# Patient Record
Sex: FEMALE | Race: WHITE | NOT HISPANIC OR LATINO | Employment: UNEMPLOYED | URBAN - METROPOLITAN AREA
[De-identification: names, ages, dates, MRNs, and addresses within clinical notes are randomized per-mention and may not be internally consistent; named-entity substitution may affect disease eponyms.]

---

## 2019-01-05 ENCOUNTER — APPOINTMENT (OUTPATIENT)
Dept: RADIOLOGY | Facility: MEDICAL CENTER | Age: 24
DRG: 087 | End: 2019-01-05
Attending: NURSE PRACTITIONER
Payer: COMMERCIAL

## 2019-01-05 ENCOUNTER — APPOINTMENT (OUTPATIENT)
Dept: RADIOLOGY | Facility: MEDICAL CENTER | Age: 24
DRG: 087 | End: 2019-01-05
Attending: EMERGENCY MEDICINE
Payer: COMMERCIAL

## 2019-01-05 ENCOUNTER — HOSPITAL ENCOUNTER (INPATIENT)
Facility: MEDICAL CENTER | Age: 24
LOS: 4 days | DRG: 087 | End: 2019-01-09
Attending: EMERGENCY MEDICINE | Admitting: SURGERY
Payer: COMMERCIAL

## 2019-01-05 PROBLEM — E87.6 HYPOKALEMIA: Status: ACTIVE | Noted: 2019-01-05

## 2019-01-05 PROBLEM — S01.01XA LACERATION OF SCALP WITHOUT FOREIGN BODY: Status: ACTIVE | Noted: 2019-01-05

## 2019-01-05 PROBLEM — S02.5XXD CLOSED FRACTURE OF TOOTH WITH ROUTINE HEALING: Status: ACTIVE | Noted: 2019-01-05

## 2019-01-05 PROBLEM — S42.031D CLOSED DISPLACED FRACTURE OF ACROMIAL END OF RIGHT CLAVICLE WITH ROUTINE HEALING: Status: ACTIVE | Noted: 2019-01-05

## 2019-01-05 PROBLEM — S06.0X1A CONCUSSION WITH BRIEF LOC: Status: ACTIVE | Noted: 2019-01-05

## 2019-01-05 PROBLEM — Z53.09 CONTRAINDICATION TO ANTICOAGULATION THERAPY: Status: ACTIVE | Noted: 2019-01-05

## 2019-01-05 PROBLEM — T14.90XA TRAUMA: Status: ACTIVE | Noted: 2019-01-05

## 2019-01-05 PROBLEM — S06.341A TRAUMATIC HEMORRHAGE OF RIGHT CEREBRUM WITH LOSS OF CONSCIOUSNESS OF 30 MINUTES OR LESS (HCC): Status: ACTIVE | Noted: 2019-01-05

## 2019-01-05 PROBLEM — M79.641 PAIN OF RIGHT HAND: Status: ACTIVE | Noted: 2019-01-05

## 2019-01-05 LAB
ABO GROUP BLD: NORMAL
ABO GROUP BLD: NORMAL
ALBUMIN SERPL BCP-MCNC: 4.5 G/DL (ref 3.2–4.9)
ALBUMIN/GLOB SERPL: 1.7 G/DL
ALP SERPL-CCNC: 54 U/L (ref 30–99)
ALT SERPL-CCNC: 18 U/L (ref 2–50)
ANION GAP SERPL CALC-SCNC: 14 MMOL/L (ref 0–11.9)
APTT PPP: 29.5 SEC (ref 24.7–36)
AST SERPL-CCNC: 24 U/L (ref 12–45)
BILIRUB SERPL-MCNC: 1.1 MG/DL (ref 0.1–1.5)
BLD GP AB SCN SERPL QL: NORMAL
BUN SERPL-MCNC: 13 MG/DL (ref 8–22)
CALCIUM SERPL-MCNC: 9.2 MG/DL (ref 8.5–10.5)
CFT BLD TEG: 5.1 MIN (ref 5–10)
CHLORIDE SERPL-SCNC: 106 MMOL/L (ref 96–112)
CLOT ANGLE BLD TEG: 64.3 DEGREES (ref 53–72)
CLOT LYSIS 30M P MA LENFR BLD TEG: 0 % (ref 0–8)
CO2 SERPL-SCNC: 18 MMOL/L (ref 20–33)
CREAT SERPL-MCNC: 0.75 MG/DL (ref 0.5–1.4)
CT.EXTRINSIC BLD ROTEM: 1.9 MIN (ref 1–3)
ERYTHROCYTE [DISTWIDTH] IN BLOOD BY AUTOMATED COUNT: 40 FL (ref 35.9–50)
ETHANOL BLD-MCNC: 0 G/DL
GLOBULIN SER CALC-MCNC: 2.7 G/DL (ref 1.9–3.5)
GLUCOSE SERPL-MCNC: 166 MG/DL (ref 65–99)
HCG SERPL QL: NEGATIVE
HCT VFR BLD AUTO: 39 % (ref 37–47)
HGB BLD-MCNC: 13.1 G/DL (ref 12–16)
INR PPP: 1.2 (ref 0.87–1.13)
LACTATE BLD-SCNC: 3.1 MMOL/L (ref 0.5–2)
MCF BLD TEG: 62.7 MM (ref 50–70)
MCH RBC QN AUTO: 30.5 PG (ref 27–33)
MCHC RBC AUTO-ENTMCNC: 33.6 G/DL (ref 33.6–35)
MCV RBC AUTO: 90.9 FL (ref 81.4–97.8)
PA AA BLD-ACNC: 0 %
PA ADP BLD-ACNC: 31 %
PLATELET # BLD AUTO: 347 K/UL (ref 164–446)
PMV BLD AUTO: 9.5 FL (ref 9–12.9)
POTASSIUM SERPL-SCNC: 2.6 MMOL/L (ref 3.6–5.5)
PROT SERPL-MCNC: 7.2 G/DL (ref 6–8.2)
PROTHROMBIN TIME: 15.3 SEC (ref 12–14.6)
RBC # BLD AUTO: 4.29 M/UL (ref 4.2–5.4)
RH BLD: NORMAL
RH BLD: NORMAL
SODIUM SERPL-SCNC: 138 MMOL/L (ref 135–145)
TEG ALGORITHM TGALG: NORMAL
WBC # BLD AUTO: 9.1 K/UL (ref 4.8–10.8)

## 2019-01-05 PROCEDURE — 90471 IMMUNIZATION ADMIN: CPT

## 2019-01-05 PROCEDURE — 96374 THER/PROPH/DIAG INJ IV PUSH: CPT

## 2019-01-05 PROCEDURE — 85384 FIBRINOGEN ACTIVITY: CPT

## 2019-01-05 PROCEDURE — 70450 CT HEAD/BRAIN W/O DYE: CPT

## 2019-01-05 PROCEDURE — 700101 HCHG RX REV CODE 250: Performed by: NURSE PRACTITIONER

## 2019-01-05 PROCEDURE — 86850 RBC ANTIBODY SCREEN: CPT

## 2019-01-05 PROCEDURE — 85576 BLOOD PLATELET AGGREGATION: CPT | Mod: 91

## 2019-01-05 PROCEDURE — 83605 ASSAY OF LACTIC ACID: CPT

## 2019-01-05 PROCEDURE — 0HQ0XZZ REPAIR SCALP SKIN, EXTERNAL APPROACH: ICD-10-PCS | Performed by: SURGERY

## 2019-01-05 PROCEDURE — 70486 CT MAXILLOFACIAL W/O DYE: CPT

## 2019-01-05 PROCEDURE — 99291 CRITICAL CARE FIRST HOUR: CPT

## 2019-01-05 PROCEDURE — 85027 COMPLETE CBC AUTOMATED: CPT

## 2019-01-05 PROCEDURE — 85610 PROTHROMBIN TIME: CPT

## 2019-01-05 PROCEDURE — 700101 HCHG RX REV CODE 250: Performed by: SURGERY

## 2019-01-05 PROCEDURE — 90715 TDAP VACCINE 7 YRS/> IM: CPT | Performed by: EMERGENCY MEDICINE

## 2019-01-05 PROCEDURE — 80307 DRUG TEST PRSMV CHEM ANLYZR: CPT

## 2019-01-05 PROCEDURE — G0390 TRAUMA RESPONS W/HOSP CRITI: HCPCS

## 2019-01-05 PROCEDURE — A9270 NON-COVERED ITEM OR SERVICE: HCPCS | Performed by: SURGERY

## 2019-01-05 PROCEDURE — 72170 X-RAY EXAM OF PELVIS: CPT

## 2019-01-05 PROCEDURE — 86901 BLOOD TYPING SEROLOGIC RH(D): CPT

## 2019-01-05 PROCEDURE — 770022 HCHG ROOM/CARE - ICU (200)

## 2019-01-05 PROCEDURE — 71045 X-RAY EXAM CHEST 1 VIEW: CPT

## 2019-01-05 PROCEDURE — 86900 BLOOD TYPING SEROLOGIC ABO: CPT

## 2019-01-05 PROCEDURE — 700117 HCHG RX CONTRAST REV CODE 255: Performed by: EMERGENCY MEDICINE

## 2019-01-05 PROCEDURE — 72128 CT CHEST SPINE W/O DYE: CPT

## 2019-01-05 PROCEDURE — 700111 HCHG RX REV CODE 636 W/ 250 OVERRIDE (IP): Performed by: NURSE PRACTITIONER

## 2019-01-05 PROCEDURE — 72125 CT NECK SPINE W/O DYE: CPT

## 2019-01-05 PROCEDURE — 700111 HCHG RX REV CODE 636 W/ 250 OVERRIDE (IP): Performed by: EMERGENCY MEDICINE

## 2019-01-05 PROCEDURE — 700101 HCHG RX REV CODE 250

## 2019-01-05 PROCEDURE — 94760 N-INVAS EAR/PLS OXIMETRY 1: CPT

## 2019-01-05 PROCEDURE — 76705 ECHO EXAM OF ABDOMEN: CPT

## 2019-01-05 PROCEDURE — 71260 CT THORAX DX C+: CPT

## 2019-01-05 PROCEDURE — 84703 CHORIONIC GONADOTROPIN ASSAY: CPT

## 2019-01-05 PROCEDURE — 700102 HCHG RX REV CODE 250 W/ 637 OVERRIDE(OP): Performed by: SURGERY

## 2019-01-05 PROCEDURE — 85730 THROMBOPLASTIN TIME PARTIAL: CPT

## 2019-01-05 PROCEDURE — 700105 HCHG RX REV CODE 258: Performed by: NURSE PRACTITIONER

## 2019-01-05 PROCEDURE — 73120 X-RAY EXAM OF HAND: CPT | Mod: RT

## 2019-01-05 PROCEDURE — A9270 NON-COVERED ITEM OR SERVICE: HCPCS | Performed by: NURSE PRACTITIONER

## 2019-01-05 PROCEDURE — 80053 COMPREHEN METABOLIC PANEL: CPT

## 2019-01-05 PROCEDURE — 85347 COAGULATION TIME ACTIVATED: CPT

## 2019-01-05 PROCEDURE — 72131 CT LUMBAR SPINE W/O DYE: CPT

## 2019-01-05 PROCEDURE — 700102 HCHG RX REV CODE 250 W/ 637 OVERRIDE(OP): Performed by: NURSE PRACTITIONER

## 2019-01-05 RX ORDER — POTASSIUM CHLORIDE 20 MEQ/1
40 TABLET, EXTENDED RELEASE ORAL ONCE
Status: COMPLETED | OUTPATIENT
Start: 2019-01-05 | End: 2019-01-05

## 2019-01-05 RX ORDER — BACITRACIN ZINC AND POLYMYXIN B SULFATE 500; 1000 [USP'U]/G; [USP'U]/G
OINTMENT TOPICAL 3 TIMES DAILY
Status: DISCONTINUED | OUTPATIENT
Start: 2019-01-05 | End: 2019-01-09 | Stop reason: HOSPADM

## 2019-01-05 RX ORDER — SODIUM CHLORIDE 9 MG/ML
INJECTION, SOLUTION INTRAVENOUS CONTINUOUS
Status: DISCONTINUED | OUTPATIENT
Start: 2019-01-05 | End: 2019-01-05

## 2019-01-05 RX ORDER — DOCUSATE SODIUM 100 MG/1
100 CAPSULE, LIQUID FILLED ORAL 2 TIMES DAILY
Status: DISCONTINUED | OUTPATIENT
Start: 2019-01-05 | End: 2019-01-09 | Stop reason: HOSPADM

## 2019-01-05 RX ORDER — LEVETIRACETAM 500 MG/1
500 TABLET ORAL 2 TIMES DAILY
Status: DISCONTINUED | OUTPATIENT
Start: 2019-01-05 | End: 2019-01-09 | Stop reason: HOSPADM

## 2019-01-05 RX ORDER — LEVETIRACETAM 500 MG/1
500 TABLET ORAL 2 TIMES DAILY
Status: DISCONTINUED | OUTPATIENT
Start: 2019-01-05 | End: 2019-01-05

## 2019-01-05 RX ORDER — ONDANSETRON 2 MG/ML
4 INJECTION INTRAMUSCULAR; INTRAVENOUS EVERY 4 HOURS PRN
Status: DISCONTINUED | OUTPATIENT
Start: 2019-01-05 | End: 2019-01-09 | Stop reason: HOSPADM

## 2019-01-05 RX ORDER — ENEMA 19; 7 G/133ML; G/133ML
1 ENEMA RECTAL
Status: DISCONTINUED | OUTPATIENT
Start: 2019-01-05 | End: 2019-01-09 | Stop reason: HOSPADM

## 2019-01-05 RX ORDER — SODIUM CHLORIDE AND POTASSIUM CHLORIDE 150; 900 MG/100ML; MG/100ML
INJECTION, SOLUTION INTRAVENOUS CONTINUOUS
Status: DISCONTINUED | OUTPATIENT
Start: 2019-01-05 | End: 2019-01-07

## 2019-01-05 RX ORDER — BISACODYL 10 MG
10 SUPPOSITORY, RECTAL RECTAL
Status: DISCONTINUED | OUTPATIENT
Start: 2019-01-05 | End: 2019-01-09 | Stop reason: HOSPADM

## 2019-01-05 RX ORDER — AMOXICILLIN 250 MG
1 CAPSULE ORAL NIGHTLY
Status: DISCONTINUED | OUTPATIENT
Start: 2019-01-05 | End: 2019-01-09 | Stop reason: HOSPADM

## 2019-01-05 RX ORDER — LIDOCAINE HYDROCHLORIDE AND EPINEPHRINE 10; 10 MG/ML; UG/ML
INJECTION, SOLUTION INFILTRATION; PERINEURAL
Status: COMPLETED
Start: 2019-01-05 | End: 2019-01-05

## 2019-01-05 RX ORDER — OXYCODONE HYDROCHLORIDE 10 MG/1
10 TABLET ORAL
Status: DISCONTINUED | OUTPATIENT
Start: 2019-01-05 | End: 2019-01-09 | Stop reason: HOSPADM

## 2019-01-05 RX ORDER — POLYETHYLENE GLYCOL 3350 17 G/17G
1 POWDER, FOR SOLUTION ORAL 2 TIMES DAILY
Status: DISCONTINUED | OUTPATIENT
Start: 2019-01-05 | End: 2019-01-09 | Stop reason: HOSPADM

## 2019-01-05 RX ORDER — FAMOTIDINE 20 MG/1
20 TABLET, FILM COATED ORAL 2 TIMES DAILY
Status: DISCONTINUED | OUTPATIENT
Start: 2019-01-05 | End: 2019-01-07

## 2019-01-05 RX ORDER — ACETAMINOPHEN 500 MG
1000 TABLET ORAL EVERY 6 HOURS
Status: DISCONTINUED | OUTPATIENT
Start: 2019-01-05 | End: 2019-01-07

## 2019-01-05 RX ORDER — AMOXICILLIN 250 MG
1 CAPSULE ORAL
Status: DISCONTINUED | OUTPATIENT
Start: 2019-01-05 | End: 2019-01-09 | Stop reason: HOSPADM

## 2019-01-05 RX ORDER — LIDOCAINE HYDROCHLORIDE AND EPINEPHRINE 10; 10 MG/ML; UG/ML
10 INJECTION, SOLUTION INFILTRATION; PERINEURAL ONCE
Status: COMPLETED | OUTPATIENT
Start: 2019-01-05 | End: 2019-01-05

## 2019-01-05 RX ORDER — OXYCODONE HYDROCHLORIDE 5 MG/1
5 TABLET ORAL
Status: DISCONTINUED | OUTPATIENT
Start: 2019-01-05 | End: 2019-01-09 | Stop reason: HOSPADM

## 2019-01-05 RX ADMIN — SODIUM CHLORIDE: 9 INJECTION, SOLUTION INTRAVENOUS at 13:17

## 2019-01-05 RX ADMIN — ACETAMINOPHEN 1000 MG: 500 TABLET, FILM COATED ORAL at 23:52

## 2019-01-05 RX ADMIN — CLOSTRIDIUM TETANI TOXOID ANTIGEN (FORMALDEHYDE INACTIVATED), CORYNEBACTERIUM DIPHTHERIAE TOXOID ANTIGEN (FORMALDEHYDE INACTIVATED), BORDETELLA PERTUSSIS TOXOID ANTIGEN (GLUTARALDEHYDE INACTIVATED), BORDETELLA PERTUSSIS FILAMENTOUS HEMAGGLUTININ ANTIGEN (FORMALDEHYDE INACTIVATED), BORDETELLA PERTUSSIS PERTACTIN ANTIGEN, AND BORDETELLA PERTUSSIS FIMBRIAE 2/3 ANTIGEN 0.5 ML: 5; 2; 2.5; 5; 3; 5 INJECTION, SUSPENSION INTRAMUSCULAR at 12:39

## 2019-01-05 RX ADMIN — LEVETIRACETAM 500 MG: 500 TABLET, FILM COATED ORAL at 14:55

## 2019-01-05 RX ADMIN — ONDANSETRON 4 MG: 2 INJECTION INTRAMUSCULAR; INTRAVENOUS at 19:39

## 2019-01-05 RX ADMIN — LIDOCAINE HYDROCHLORIDE AND EPINEPHRINE 10 ML: 10; 10 INJECTION, SOLUTION INFILTRATION; PERINEURAL at 15:45

## 2019-01-05 RX ADMIN — FAMOTIDINE 20 MG: 20 TABLET ORAL at 17:48

## 2019-01-05 RX ADMIN — IOHEXOL 100 ML: 350 INJECTION, SOLUTION INTRAVENOUS at 12:35

## 2019-01-05 RX ADMIN — POTASSIUM CHLORIDE AND SODIUM CHLORIDE: 900; 150 INJECTION, SOLUTION INTRAVENOUS at 15:59

## 2019-01-05 RX ADMIN — OXYCODONE HYDROCHLORIDE 5 MG: 5 TABLET ORAL at 19:39

## 2019-01-05 RX ADMIN — ONDANSETRON 4 MG: 2 INJECTION INTRAMUSCULAR; INTRAVENOUS at 13:17

## 2019-01-05 RX ADMIN — FENTANYL CITRATE 100 MCG: 50 INJECTION, SOLUTION INTRAMUSCULAR; INTRAVENOUS at 11:48

## 2019-01-05 RX ADMIN — Medication 1 EACH: at 17:48

## 2019-01-05 RX ADMIN — Medication 1 EACH: at 13:17

## 2019-01-05 RX ADMIN — ACETAMINOPHEN 1000 MG: 500 TABLET, FILM COATED ORAL at 17:48

## 2019-01-05 RX ADMIN — POTASSIUM CHLORIDE 40 MEQ: 1500 TABLET, EXTENDED RELEASE ORAL at 15:58

## 2019-01-05 RX ADMIN — LIDOCAINE HYDROCHLORIDE,EPINEPHRINE BITARTRATE 10 ML: 10; .01 INJECTION, SOLUTION INFILTRATION; PERINEURAL at 15:45

## 2019-01-05 ASSESSMENT — PAIN SCALES - GENERAL
PAINLEVEL_OUTOF10: 7
PAINLEVEL_OUTOF10: 7
PAINLEVEL_OUTOF10: 6
PAINLEVEL_OUTOF10: 5
PAINLEVEL_OUTOF10: 3
PAINLEVEL_OUTOF10: 7

## 2019-01-05 ASSESSMENT — LIFESTYLE VARIABLES
HAVE PEOPLE ANNOYED YOU BY CRITICIZING YOUR DRINKING: NO
EVER HAD A DRINK FIRST THING IN THE MORNING TO STEADY YOUR NERVES TO GET RID OF A HANGOVER: NO
HAVE YOU EVER FELT YOU SHOULD CUT DOWN ON YOUR DRINKING: NO
TOTAL SCORE: 0
CONSUMPTION TOTAL: NEGATIVE
TOTAL SCORE: 0
HOW MANY TIMES IN THE PAST YEAR HAVE YOU HAD 5 OR MORE DRINKS IN A DAY: 0
ALCOHOL_USE: YES
EVER_SMOKED: NEVER
EVER FELT BAD OR GUILTY ABOUT YOUR DRINKING: NO
ON A TYPICAL DAY WHEN YOU DRINK ALCOHOL HOW MANY DRINKS DO YOU HAVE: 0
TOTAL SCORE: 0
AVERAGE NUMBER OF DAYS PER WEEK YOU HAVE A DRINK CONTAINING ALCOHOL: 0

## 2019-01-05 ASSESSMENT — COGNITIVE AND FUNCTIONAL STATUS - GENERAL
SUGGESTED CMS G CODE MODIFIER MOBILITY: CH
SUGGESTED CMS G CODE MODIFIER DAILY ACTIVITY: CH
DAILY ACTIVITIY SCORE: 24
MOBILITY SCORE: 24

## 2019-01-05 ASSESSMENT — PATIENT HEALTH QUESTIONNAIRE - PHQ9
2. FEELING DOWN, DEPRESSED, IRRITABLE, OR HOPELESS: NOT AT ALL
1. LITTLE INTEREST OR PLEASURE IN DOING THINGS: NOT AT ALL
SUM OF ALL RESPONSES TO PHQ9 QUESTIONS 1 AND 2: 0

## 2019-01-05 ASSESSMENT — COPD QUESTIONNAIRES
COPD SCREENING SCORE: 0
DURING THE PAST 4 WEEKS HOW MUCH DID YOU FEEL SHORT OF BREATH: NONE/LITTLE OF THE TIME
DO YOU EVER COUGH UP ANY MUCUS OR PHLEGM?: NO/ONLY WITH OCCASIONAL COLDS OR INFECTIONS
HAVE YOU SMOKED AT LEAST 100 CIGARETTES IN YOUR ENTIRE LIFE: NO/DON'T KNOW

## 2019-01-05 NOTE — ASSESSMENT & PLAN NOTE
Right distal clavicle fracture.  1/8 Upright films show fracture of the distal 1 cm of the clavicle with full bone width displacement of the proximal clavicle. There is also widening of the coracoclavicular distance.  Non-operative management.   Sling for comfort  Weight bearing status - Nonweightbearing RUE. Ok for range of motion  Follow up in one week for referral to Sports[facility]icine  Rubén Pfeiffer MD. Orthopedic Surgery.
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
## 2019-01-05 NOTE — PROGRESS NOTES
David from Lab called with critical result of K of 2.6 at 1507. Critical lab result read back to David.    notified of critical lab result at 1507.  Critical lab result read back by .

## 2019-01-05 NOTE — PROGRESS NOTES
1245 This RN at bedside to blue 15 to  patient. Patient connected to ICU monitor. Xray at bedside, transfer to ICU pended due to Xray. Pt A/Ox4, drowsy.     1305 Arrival in ICU, pt transferred to ICU bed, maintaining full spinal precautions. Pt nauseous and vomiting, yankaur suction in use. Pt protecting airway.     1330 Dr. Willis at bedside to assess patient.
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
## 2019-01-05 NOTE — RESPIRATORY CARE
Respiratory Trauma Red Note    Intubation No    Trauma upgraded to red due to hypotension. No respiratory interventions required at this time. See trauma narrator for full details
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
## 2019-01-06 ENCOUNTER — APPOINTMENT (OUTPATIENT)
Dept: RADIOLOGY | Facility: MEDICAL CENTER | Age: 24
DRG: 087 | End: 2019-01-06
Attending: NURSE PRACTITIONER
Payer: COMMERCIAL

## 2019-01-06 PROBLEM — S06.0X1A CONCUSSION WITH BRIEF LOC: Status: RESOLVED | Noted: 2019-01-05 | Resolved: 2019-01-06

## 2019-01-06 LAB
ALBUMIN SERPL BCP-MCNC: 3.4 G/DL (ref 3.2–4.9)
ALBUMIN/GLOB SERPL: 1.4 G/DL
ALP SERPL-CCNC: 41 U/L (ref 30–99)
ALT SERPL-CCNC: 16 U/L (ref 2–50)
ANION GAP SERPL CALC-SCNC: 7 MMOL/L (ref 0–11.9)
AST SERPL-CCNC: 18 U/L (ref 12–45)
BASOPHILS # BLD AUTO: 0.4 % (ref 0–1.8)
BASOPHILS # BLD: 0.03 K/UL (ref 0–0.12)
BILIRUB SERPL-MCNC: 1.2 MG/DL (ref 0.1–1.5)
BUN SERPL-MCNC: 9 MG/DL (ref 8–22)
CALCIUM SERPL-MCNC: 8.8 MG/DL (ref 8.5–10.5)
CHLORIDE SERPL-SCNC: 112 MMOL/L (ref 96–112)
CO2 SERPL-SCNC: 20 MMOL/L (ref 20–33)
CREAT SERPL-MCNC: 0.66 MG/DL (ref 0.5–1.4)
EOSINOPHIL # BLD AUTO: 0.01 K/UL (ref 0–0.51)
EOSINOPHIL NFR BLD: 0.1 % (ref 0–6.9)
ERYTHROCYTE [DISTWIDTH] IN BLOOD BY AUTOMATED COUNT: 42.1 FL (ref 35.9–50)
GLOBULIN SER CALC-MCNC: 2.4 G/DL (ref 1.9–3.5)
GLUCOSE SERPL-MCNC: 84 MG/DL (ref 65–99)
HCT VFR BLD AUTO: 32.2 % (ref 37–47)
HGB BLD-MCNC: 10.6 G/DL (ref 12–16)
IMM GRANULOCYTES # BLD AUTO: 0.02 K/UL (ref 0–0.11)
IMM GRANULOCYTES NFR BLD AUTO: 0.3 % (ref 0–0.9)
LYMPHOCYTES # BLD AUTO: 1.75 K/UL (ref 1–4.8)
LYMPHOCYTES NFR BLD: 22.5 % (ref 22–41)
MCH RBC QN AUTO: 30.6 PG (ref 27–33)
MCHC RBC AUTO-ENTMCNC: 32.9 G/DL (ref 33.6–35)
MCV RBC AUTO: 93.1 FL (ref 81.4–97.8)
MONOCYTES # BLD AUTO: 0.76 K/UL (ref 0–0.85)
MONOCYTES NFR BLD AUTO: 9.8 % (ref 0–13.4)
NEUTROPHILS # BLD AUTO: 5.2 K/UL (ref 2–7.15)
NEUTROPHILS NFR BLD: 66.9 % (ref 44–72)
NRBC # BLD AUTO: 0 K/UL
NRBC BLD-RTO: 0 /100 WBC
PLATELET # BLD AUTO: 202 K/UL (ref 164–446)
PMV BLD AUTO: 9.6 FL (ref 9–12.9)
POTASSIUM SERPL-SCNC: 4.1 MMOL/L (ref 3.6–5.5)
PROT SERPL-MCNC: 5.8 G/DL (ref 6–8.2)
RBC # BLD AUTO: 3.46 M/UL (ref 4.2–5.4)
SODIUM SERPL-SCNC: 139 MMOL/L (ref 135–145)
WBC # BLD AUTO: 7.8 K/UL (ref 4.8–10.8)

## 2019-01-06 PROCEDURE — A9270 NON-COVERED ITEM OR SERVICE: HCPCS | Performed by: NURSE PRACTITIONER

## 2019-01-06 PROCEDURE — 85025 COMPLETE CBC W/AUTO DIFF WBC: CPT

## 2019-01-06 PROCEDURE — 93970 EXTREMITY STUDY: CPT | Mod: 26 | Performed by: SURGERY

## 2019-01-06 PROCEDURE — 700101 HCHG RX REV CODE 250: Performed by: SURGERY

## 2019-01-06 PROCEDURE — 770022 HCHG ROOM/CARE - ICU (200)

## 2019-01-06 PROCEDURE — 700102 HCHG RX REV CODE 250 W/ 637 OVERRIDE(OP): Performed by: NURSE PRACTITIONER

## 2019-01-06 PROCEDURE — 700111 HCHG RX REV CODE 636 W/ 250 OVERRIDE (IP): Performed by: NURSE PRACTITIONER

## 2019-01-06 PROCEDURE — 71045 X-RAY EXAM CHEST 1 VIEW: CPT

## 2019-01-06 PROCEDURE — 80053 COMPREHEN METABOLIC PANEL: CPT

## 2019-01-06 PROCEDURE — 99233 SBSQ HOSP IP/OBS HIGH 50: CPT | Performed by: SURGERY

## 2019-01-06 PROCEDURE — 700112 HCHG RX REV CODE 229: Performed by: NURSE PRACTITIONER

## 2019-01-06 PROCEDURE — 700101 HCHG RX REV CODE 250: Performed by: NURSE PRACTITIONER

## 2019-01-06 PROCEDURE — 700111 HCHG RX REV CODE 636 W/ 250 OVERRIDE (IP): Performed by: SURGERY

## 2019-01-06 PROCEDURE — 93970 EXTREMITY STUDY: CPT

## 2019-01-06 RX ADMIN — LEVETIRACETAM 500 MG: 500 TABLET, FILM COATED ORAL at 05:21

## 2019-01-06 RX ADMIN — ACETAMINOPHEN 1000 MG: 500 TABLET, FILM COATED ORAL at 17:49

## 2019-01-06 RX ADMIN — ACETAMINOPHEN 1000 MG: 500 TABLET, FILM COATED ORAL at 05:21

## 2019-01-06 RX ADMIN — Medication 1 EACH: at 17:46

## 2019-01-06 RX ADMIN — LEVETIRACETAM 500 MG: 500 TABLET, FILM COATED ORAL at 17:45

## 2019-01-06 RX ADMIN — ONDANSETRON 4 MG: 2 INJECTION INTRAMUSCULAR; INTRAVENOUS at 13:53

## 2019-01-06 RX ADMIN — ONDANSETRON 4 MG: 2 INJECTION INTRAMUSCULAR; INTRAVENOUS at 17:46

## 2019-01-06 RX ADMIN — ACETAMINOPHEN 1000 MG: 500 TABLET, FILM COATED ORAL at 11:29

## 2019-01-06 RX ADMIN — OXYCODONE HYDROCHLORIDE 5 MG: 5 TABLET ORAL at 14:34

## 2019-01-06 RX ADMIN — DOCUSATE SODIUM 100 MG: 100 CAPSULE, LIQUID FILLED ORAL at 05:21

## 2019-01-06 RX ADMIN — FAMOTIDINE 20 MG: 20 TABLET ORAL at 17:45

## 2019-01-06 RX ADMIN — Medication 1 EACH: at 05:21

## 2019-01-06 RX ADMIN — Medication 1 EACH: at 11:29

## 2019-01-06 RX ADMIN — ENOXAPARIN SODIUM 30 MG: 100 INJECTION SUBCUTANEOUS at 11:29

## 2019-01-06 RX ADMIN — POTASSIUM CHLORIDE AND SODIUM CHLORIDE: 900; 150 INJECTION, SOLUTION INTRAVENOUS at 17:47

## 2019-01-06 RX ADMIN — FAMOTIDINE 20 MG: 20 TABLET ORAL at 05:21

## 2019-01-06 ASSESSMENT — PAIN SCALES - GENERAL
PAINLEVEL_OUTOF10: 2
PAINLEVEL_OUTOF10: 0
PAINLEVEL_OUTOF10: 7
PAINLEVEL_OUTOF10: 0

## 2019-01-06 ASSESSMENT — ENCOUNTER SYMPTOMS
CARDIOVASCULAR NEGATIVE: 1
RESPIRATORY NEGATIVE: 1
EYES NEGATIVE: 1
HEADACHES: 1
CONSTITUTIONAL NEGATIVE: 1
ARTHRALGIAS: 1
GASTROINTESTINAL NEGATIVE: 1

## 2019-01-06 NOTE — PROGRESS NOTES
Head CT last night 1/5/19 7:30 pm was improved.  No surgical lesions seen.    Recommend one more day in ICU.    OK for lovenox.    Full note to follow.
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
## 2019-01-06 NOTE — ASSESSMENT & PLAN NOTE
Systemic anticoagulation contraindicated secondary to elevated bleeding risk.  1/6 Pharmacological DVT prophylaxis, Lovenox initiated   Lower extremity duplex as clinically indicated

## 2019-01-06 NOTE — CARE PLAN
Problem: Safety  Goal: Will remain free from falls  Outcome: PROGRESSING AS EXPECTED  Educate on fall risk, call light in reach, side rails up, bed in low position     Problem: Pain Management  Goal: Pain level will decrease to patient's comfort goal  Outcome: PROGRESSING AS EXPECTED  Pt declining pain medication, decreasing stimuli

## 2019-01-06 NOTE — CARE PLAN
Problem: Communication  Goal: The ability to communicate needs accurately and effectively will improve  Outcome: PROGRESSING AS EXPECTED  AAO X4    Problem: Pain Management  Goal: Pain level will decrease to patient's comfort goal  Outcome: PROGRESSING AS EXPECTED  PRNs available

## 2019-01-06 NOTE — CARE PLAN
Problem: Safety  Goal: Will remain free from injury  Outcome: PROGRESSING AS EXPECTED  Fall precautions in place, oriented to call light and to call for assistance. Patient verbalizes understanding.     Problem: Pain Management  Goal: Pain level will decrease to patient's comfort goal  Outcome: PROGRESSING AS EXPECTED   01/05/19 2000   OTHER   Pain Scale 0 - 10  7   Comfort Goal Comfort with Movement     Assessing patients pain q2 hours and PRN, medicating per MAR.
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
## 2019-01-06 NOTE — PROGRESS NOTES
2 RN skin assessment completed: R eye bruising and swelling, posterior head lac with staples, VANNA, RUE bruising and abrasions. No pressure concerns.

## 2019-01-06 NOTE — PROGRESS NOTES
2 RN Skin check  Bruising noted to right orbital area. Patient also has laceration repaired with staples on her scalp. No concerns for pressure injuries at this time.

## 2019-01-06 NOTE — PROGRESS NOTES
Dr. Willis at bedside for exam, OK per MD for q2hr neuro checks, OK for increased mobility as pt tolerates, OK per MD for lovenox from neurosurg standpoint.

## 2019-01-06 NOTE — ASSESSMENT & PLAN NOTE
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 No hydrocephalus.  Repeat  CT stable  Keppra x 7 days    Cognitive evaluation without further needs  PT / OT evaluations completed    Non-operative management.  Terry Willis MD. Neurosurgery.

## 2019-01-07 ENCOUNTER — APPOINTMENT (OUTPATIENT)
Dept: RADIOLOGY | Facility: MEDICAL CENTER | Age: 24
DRG: 087 | End: 2019-01-07
Attending: PHYSICIAN ASSISTANT
Payer: COMMERCIAL

## 2019-01-07 ENCOUNTER — APPOINTMENT (OUTPATIENT)
Dept: RADIOLOGY | Facility: MEDICAL CENTER | Age: 24
DRG: 087 | End: 2019-01-07
Attending: NURSE PRACTITIONER
Payer: COMMERCIAL

## 2019-01-07 PROBLEM — S02.19XA CLOSED FRACTURE OF MASTOID BONE (HCC): Status: ACTIVE | Noted: 2019-01-07

## 2019-01-07 LAB
ALBUMIN SERPL BCP-MCNC: 3.7 G/DL (ref 3.2–4.9)
ALBUMIN/GLOB SERPL: 1.3 G/DL
ALP SERPL-CCNC: 44 U/L (ref 30–99)
ALT SERPL-CCNC: 13 U/L (ref 2–50)
ANION GAP SERPL CALC-SCNC: 10 MMOL/L (ref 0–11.9)
AST SERPL-CCNC: 16 U/L (ref 12–45)
BASOPHILS # BLD AUTO: 0.6 % (ref 0–1.8)
BASOPHILS # BLD: 0.03 K/UL (ref 0–0.12)
BILIRUB SERPL-MCNC: 0.9 MG/DL (ref 0.1–1.5)
BUN SERPL-MCNC: 10 MG/DL (ref 8–22)
CALCIUM SERPL-MCNC: 9.2 MG/DL (ref 8.5–10.5)
CHLORIDE SERPL-SCNC: 109 MMOL/L (ref 96–112)
CO2 SERPL-SCNC: 21 MMOL/L (ref 20–33)
CREAT SERPL-MCNC: 0.69 MG/DL (ref 0.5–1.4)
EOSINOPHIL # BLD AUTO: 0.07 K/UL (ref 0–0.51)
EOSINOPHIL NFR BLD: 1.5 % (ref 0–6.9)
ERYTHROCYTE [DISTWIDTH] IN BLOOD BY AUTOMATED COUNT: 41 FL (ref 35.9–50)
GLOBULIN SER CALC-MCNC: 2.8 G/DL (ref 1.9–3.5)
GLUCOSE SERPL-MCNC: 77 MG/DL (ref 65–99)
HCT VFR BLD AUTO: 33.3 % (ref 37–47)
HGB BLD-MCNC: 11.2 G/DL (ref 12–16)
IMM GRANULOCYTES # BLD AUTO: 0.01 K/UL (ref 0–0.11)
IMM GRANULOCYTES NFR BLD AUTO: 0.2 % (ref 0–0.9)
LYMPHOCYTES # BLD AUTO: 1.35 K/UL (ref 1–4.8)
LYMPHOCYTES NFR BLD: 29 % (ref 22–41)
MAGNESIUM SERPL-MCNC: 1.8 MG/DL (ref 1.5–2.5)
MCH RBC QN AUTO: 31 PG (ref 27–33)
MCHC RBC AUTO-ENTMCNC: 33.6 G/DL (ref 33.6–35)
MCV RBC AUTO: 92.2 FL (ref 81.4–97.8)
MONOCYTES # BLD AUTO: 0.35 K/UL (ref 0–0.85)
MONOCYTES NFR BLD AUTO: 7.5 % (ref 0–13.4)
NEUTROPHILS # BLD AUTO: 2.84 K/UL (ref 2–7.15)
NEUTROPHILS NFR BLD: 61.2 % (ref 44–72)
NRBC # BLD AUTO: 0 K/UL
NRBC BLD-RTO: 0 /100 WBC
PHOSPHATE SERPL-MCNC: 3.4 MG/DL (ref 2.5–4.5)
PLATELET # BLD AUTO: 187 K/UL (ref 164–446)
PMV BLD AUTO: 9.5 FL (ref 9–12.9)
POTASSIUM SERPL-SCNC: 3.9 MMOL/L (ref 3.6–5.5)
PROT SERPL-MCNC: 6.5 G/DL (ref 6–8.2)
RBC # BLD AUTO: 3.61 M/UL (ref 4.2–5.4)
SODIUM SERPL-SCNC: 140 MMOL/L (ref 135–145)
WBC # BLD AUTO: 4.7 K/UL (ref 4.8–10.8)

## 2019-01-07 PROCEDURE — 70450 CT HEAD/BRAIN W/O DYE: CPT

## 2019-01-07 PROCEDURE — 83735 ASSAY OF MAGNESIUM: CPT

## 2019-01-07 PROCEDURE — 97165 OT EVAL LOW COMPLEX 30 MIN: CPT

## 2019-01-07 PROCEDURE — 700101 HCHG RX REV CODE 250: Performed by: NURSE PRACTITIONER

## 2019-01-07 PROCEDURE — 700111 HCHG RX REV CODE 636 W/ 250 OVERRIDE (IP): Performed by: SURGERY

## 2019-01-07 PROCEDURE — 84100 ASSAY OF PHOSPHORUS: CPT

## 2019-01-07 PROCEDURE — 700102 HCHG RX REV CODE 250 W/ 637 OVERRIDE(OP): Performed by: SURGERY

## 2019-01-07 PROCEDURE — 700101 HCHG RX REV CODE 250: Performed by: SURGERY

## 2019-01-07 PROCEDURE — 770006 HCHG ROOM/CARE - MED/SURG/GYN SEMI*

## 2019-01-07 PROCEDURE — A9270 NON-COVERED ITEM OR SERVICE: HCPCS | Performed by: SURGERY

## 2019-01-07 PROCEDURE — 92523 SPEECH SOUND LANG COMPREHEN: CPT

## 2019-01-07 PROCEDURE — 97162 PT EVAL MOD COMPLEX 30 MIN: CPT

## 2019-01-07 PROCEDURE — 700111 HCHG RX REV CODE 636 W/ 250 OVERRIDE (IP): Performed by: NURSE PRACTITIONER

## 2019-01-07 PROCEDURE — A9270 NON-COVERED ITEM OR SERVICE: HCPCS | Performed by: NURSE PRACTITIONER

## 2019-01-07 PROCEDURE — 80053 COMPREHEN METABOLIC PANEL: CPT

## 2019-01-07 PROCEDURE — 700102 HCHG RX REV CODE 250 W/ 637 OVERRIDE(OP): Performed by: NURSE PRACTITIONER

## 2019-01-07 PROCEDURE — 85025 COMPLETE CBC W/AUTO DIFF WBC: CPT

## 2019-01-07 PROCEDURE — 700112 HCHG RX REV CODE 229: Performed by: NURSE PRACTITIONER

## 2019-01-07 PROCEDURE — 99233 SBSQ HOSP IP/OBS HIGH 50: CPT | Performed by: SURGERY

## 2019-01-07 RX ORDER — BUTALBITAL, ACETAMINOPHEN AND CAFFEINE 50; 325; 40 MG/1; MG/1; MG/1
1 TABLET ORAL EVERY 6 HOURS PRN
Status: DISCONTINUED | OUTPATIENT
Start: 2019-01-07 | End: 2019-01-09 | Stop reason: HOSPADM

## 2019-01-07 RX ORDER — ACETAMINOPHEN 325 MG/1
650 TABLET ORAL EVERY 6 HOURS
Status: DISCONTINUED | OUTPATIENT
Start: 2019-01-07 | End: 2019-01-09 | Stop reason: HOSPADM

## 2019-01-07 RX ADMIN — Medication 1 EACH: at 11:32

## 2019-01-07 RX ADMIN — ACETAMINOPHEN 1000 MG: 500 TABLET, FILM COATED ORAL at 11:30

## 2019-01-07 RX ADMIN — POTASSIUM CHLORIDE AND SODIUM CHLORIDE: 900; 150 INJECTION, SOLUTION INTRAVENOUS at 12:21

## 2019-01-07 RX ADMIN — ACETAMINOPHEN 650 MG: 325 TABLET, FILM COATED ORAL at 17:47

## 2019-01-07 RX ADMIN — DOCUSATE SODIUM 100 MG: 100 CAPSULE, LIQUID FILLED ORAL at 17:47

## 2019-01-07 RX ADMIN — ENOXAPARIN SODIUM 30 MG: 100 INJECTION SUBCUTANEOUS at 05:17

## 2019-01-07 RX ADMIN — LEVETIRACETAM 500 MG: 500 TABLET, FILM COATED ORAL at 17:47

## 2019-01-07 RX ADMIN — FAMOTIDINE 20 MG: 20 TABLET ORAL at 05:17

## 2019-01-07 RX ADMIN — ENOXAPARIN SODIUM 30 MG: 100 INJECTION SUBCUTANEOUS at 17:47

## 2019-01-07 RX ADMIN — ONDANSETRON 4 MG: 2 INJECTION INTRAMUSCULAR; INTRAVENOUS at 07:12

## 2019-01-07 RX ADMIN — Medication 1 EACH: at 17:47

## 2019-01-07 RX ADMIN — Medication 1 EACH: at 05:18

## 2019-01-07 RX ADMIN — BUTALBITAL, ACETAMINOPHEN, AND CAFFEINE 1 TABLET: 50; 325; 40 TABLET ORAL at 19:29

## 2019-01-07 RX ADMIN — LEVETIRACETAM 500 MG: 500 TABLET, FILM COATED ORAL at 05:17

## 2019-01-07 RX ADMIN — ACETAMINOPHEN 1000 MG: 500 TABLET, FILM COATED ORAL at 01:04

## 2019-01-07 RX ADMIN — ACETAMINOPHEN 1000 MG: 500 TABLET, FILM COATED ORAL at 05:17

## 2019-01-07 ASSESSMENT — PAIN SCALES - GENERAL
PAINLEVEL_OUTOF10: 1
PAINLEVEL_OUTOF10: 0
PAINLEVEL_OUTOF10: 3
PAINLEVEL_OUTOF10: 4
PAINLEVEL_OUTOF10: 0
PAINLEVEL_OUTOF10: 5
PAINLEVEL_OUTOF10: 0
PAINLEVEL_OUTOF10: 3
PAINLEVEL_OUTOF10: 2
PAINLEVEL_OUTOF10: 0
PAINLEVEL_OUTOF10: 2

## 2019-01-07 ASSESSMENT — ENCOUNTER SYMPTOMS
PSYCHIATRIC NEGATIVE: 1
NEUROLOGICAL NEGATIVE: 1
MYALGIAS: 1
RESPIRATORY NEGATIVE: 1

## 2019-01-07 ASSESSMENT — COGNITIVE AND FUNCTIONAL STATUS - GENERAL
DAILY ACTIVITIY SCORE: 24
SUGGESTED CMS G CODE MODIFIER DAILY ACTIVITY: CH
MOBILITY SCORE: 24
SUGGESTED CMS G CODE MODIFIER MOBILITY: CH

## 2019-01-07 ASSESSMENT — GAIT ASSESSMENTS
DISTANCE (FEET): 300
GAIT LEVEL OF ASSIST: SUPERVISED

## 2019-01-07 ASSESSMENT — ACTIVITIES OF DAILY LIVING (ADL): TOILETING: INDEPENDENT

## 2019-01-07 NOTE — ASSESSMENT & PLAN NOTE
Nondisplaced right superior mastoid air cell fracture is longitudinal but does not appear to cross the ossicles  Analgesia
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
## 2019-01-07 NOTE — CARE PLAN
Problem: Pain Management  Goal: Pain level will decrease to patient's comfort goal  Outcome: PROGRESSING AS EXPECTED  Pt's pain will decrease to comfort goal through rest, repositioning, a quiet environment, and PRN pharmacologic analgesia.      Problem: Skin Integrity  Goal: Risk for impaired skin integrity will decrease  Outcome: PROGRESSING AS EXPECTED  Skin will be assessed frequently for breakdown and pt will remain clean, dry, and intact. All listed wounds will be assessed.

## 2019-01-07 NOTE — PROGRESS NOTES
2 RN skin assessment complete. Areas to note: posterior head lac with staples, bruising and abrasions to RUE and right eye bruising and swelling.

## 2019-01-07 NOTE — CARE PLAN
Problem: Safety  Goal: Will remain free from falls    Intervention: Implement fall precautions  Bed locked and in lowest position, pt educated on level of risk and to call for assistance prior to getting out of bed. Treaded slipper socks on when out of be.d       Problem: Pain Management  Goal: Pain level will decrease to patient's comfort goal    Intervention: Follow pain managment plan developed in collaboration with patient and Interdisciplinary Team  Pain assessed during each pt encounter, pt educated on the numeric pain rating scale.
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
## 2019-01-07 NOTE — DISCHARGE PLANNING
Care Transition Team Assessment    In the case of an emergency, pt's legal NOK is Evelyn Hoover (Mother) [phone]    LSW met with pt at bedside and obtained the information used in this assessment. Pt verified accuracy of facesheet. Pt lives in a one story with home with her 4 year old son, boyfriend, mother and step father.  Pt uses [facility] in [place]. Prior to current hospitalization, pt was completely independent in ADLS/IADLS. Pt drives and is able to attend necessary MD appointments. 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 Pt has a good support system. Pt denies any hx of substance use and denies any dx of mh.         Information Source  Orientation : Oriented x 4  Information Given By: Patient  Informant's Name:  (Lavinia)  Who is responsible for making decisions for patient? : Patient    Readmission Evaluation  Is this a readmission?: No    Elopement Risk  Legal Hold: No  Ambulatory or Self Mobile in Wheelchair: Yes  Disoriented: No  Psychiatric Symptoms: None  History of Wandering: No  Elopement this Admit: No  Vocalizing Wanting to Leave: No  Displays Behaviors, Body Language Wanting to Leave: No-Not at Risk for Elopement  Elopement Risk: Not at Risk for Elopement    Interdisciplinary Discharge Planning  Lives with - Patient's Self Care Capacity: Significant Other  Patient or legal guardian wants to designate a caregiver (see row info): No  Housing / Facility: [address]    Discharge Preparedness  What is your plan after discharge?: Uncertain - pending medical team collaboration  What are your discharge supports?: Parent, Spouse  Prior Functional Level: Ambulatory, Drives Self, Independent with Activities of Daily Living, Independent with Medication Management  Difficulity with ADLs: None  Difficulity with IADLs: Cooking, Driving, Laundry, Shopping    Functional Assesment  Prior Functional Level: Ambulatory, Drives Self, Independent with Activities of Daily Living, Independent  with Medication Management    Finances  Financial Barriers to Discharge: No  Prescription Coverage: Yes              Advance Directive  Advance Directive?: None         Psychological Assessment  History of Substance Abuse: None  History of Psychiatric Problems: No  Non-compliant with Treatment: No  Newly Diagnosed Illness: Yes    Discharge Risks or Barriers  Discharge risks or barriers?: No    Anticipated Discharge Information  Anticipated discharge disposition: Discharge needs currently unknown, DME, HHC, Home

## 2019-01-07 NOTE — THERAPY
Speech Language Therapy Evaluation completed to address cognition    Functional Status: Pt was seen at bedside for cognitive-linguistic evaluation. Pt was alert, oriented x4 and fully participatory with all therapy objectives. Pt's family members were present for duration of today's session. RN reported no observed deficits during recent interactions with patient.     Pt was administered the Cognitive-Linguistic Quick Test (CLQT) which is a standardized assessment of various cognitive domains. Pt's scores are then compared against same-aged peers and assigned a Composite Severity Rating range. Pt demonstrated WFL for measures of Attention (195), Memory (182), Executive Functions (30), Language (35) and Visuospatial Skills (89). Pt received an overall Composite Severity Rating range of 4.0 (WFL), as Pt demonstrated no deficits during task administration.     Pt was also administered various informal evaluations of reading and writing. Pt demonstrated +10/10 (100%) accuracy across all tasks, including following written directions, writing personal name and address, and copying full sentences. Pt also demonstrated +10/10 (100%) accuracy during reading comprehension task. At this time, Pt has demonstrated WFL for all cognitive domains assessed. Pt demonstrated good self-monitoring skills and was appropriate during task administration. Pt/family denied any questions/concerns and endorsed appreciation for today's visit. Inpatient SLP to sign off. Please re-consult this service if new and/or worsening symptoms arise. Thank you for the consult.     Recommendations:  Given the aforementioned information, no further inpatient skilled SLP services are warranted, as Pt is likely at baseline cognitive functioning. SLP to sign off.    Plan of Care: Patient with no further skilled SLP needs in the acute care setting at this time  Post-Acute Therapy: Anticipate that the patient will have no further speech therapy needs after  "discharge from the hospital.    See \"Rehab Therapy-Acute\" Patient Summary Report for complete documentation.   "

## 2019-01-08 ENCOUNTER — APPOINTMENT (OUTPATIENT)
Dept: RADIOLOGY | Facility: MEDICAL CENTER | Age: 24
DRG: 087 | End: 2019-01-08
Attending: NURSE PRACTITIONER
Payer: COMMERCIAL

## 2019-01-08 ENCOUNTER — APPOINTMENT (OUTPATIENT)
Dept: RADIOLOGY | Facility: MEDICAL CENTER | Age: 24
DRG: 087 | End: 2019-01-08
Attending: PHYSICIAN ASSISTANT
Payer: COMMERCIAL

## 2019-01-08 PROBLEM — E87.6 HYPOKALEMIA: Status: RESOLVED | Noted: 2019-01-05 | Resolved: 2019-01-08

## 2019-01-08 LAB
ALBUMIN SERPL BCP-MCNC: 3.9 G/DL (ref 3.2–4.9)
ALBUMIN/GLOB SERPL: 1.3 G/DL
ALP SERPL-CCNC: 45 U/L (ref 30–99)
ALT SERPL-CCNC: 12 U/L (ref 2–50)
ANION GAP SERPL CALC-SCNC: 12 MMOL/L (ref 0–11.9)
AST SERPL-CCNC: 18 U/L (ref 12–45)
BASOPHILS # BLD AUTO: 0.4 % (ref 0–1.8)
BASOPHILS # BLD: 0.02 K/UL (ref 0–0.12)
BILIRUB SERPL-MCNC: 0.9 MG/DL (ref 0.1–1.5)
BUN SERPL-MCNC: 12 MG/DL (ref 8–22)
CALCIUM SERPL-MCNC: 9.1 MG/DL (ref 8.5–10.5)
CHLORIDE SERPL-SCNC: 106 MMOL/L (ref 96–112)
CO2 SERPL-SCNC: 20 MMOL/L (ref 20–33)
CREAT SERPL-MCNC: 0.69 MG/DL (ref 0.5–1.4)
EOSINOPHIL # BLD AUTO: 0.16 K/UL (ref 0–0.51)
EOSINOPHIL NFR BLD: 3.5 % (ref 0–6.9)
ERYTHROCYTE [DISTWIDTH] IN BLOOD BY AUTOMATED COUNT: 39.7 FL (ref 35.9–50)
GLOBULIN SER CALC-MCNC: 3.1 G/DL (ref 1.9–3.5)
GLUCOSE SERPL-MCNC: 64 MG/DL (ref 65–99)
HCT VFR BLD AUTO: 35 % (ref 37–47)
HGB BLD-MCNC: 11.7 G/DL (ref 12–16)
IMM GRANULOCYTES # BLD AUTO: 0.02 K/UL (ref 0–0.11)
IMM GRANULOCYTES NFR BLD AUTO: 0.4 % (ref 0–0.9)
LYMPHOCYTES # BLD AUTO: 1.29 K/UL (ref 1–4.8)
LYMPHOCYTES NFR BLD: 28.5 % (ref 22–41)
MCH RBC QN AUTO: 30.5 PG (ref 27–33)
MCHC RBC AUTO-ENTMCNC: 33.4 G/DL (ref 33.6–35)
MCV RBC AUTO: 91.4 FL (ref 81.4–97.8)
MONOCYTES # BLD AUTO: 0.29 K/UL (ref 0–0.85)
MONOCYTES NFR BLD AUTO: 6.4 % (ref 0–13.4)
NEUTROPHILS # BLD AUTO: 2.74 K/UL (ref 2–7.15)
NEUTROPHILS NFR BLD: 60.8 % (ref 44–72)
NRBC # BLD AUTO: 0 K/UL
NRBC BLD-RTO: 0 /100 WBC
PLATELET # BLD AUTO: 213 K/UL (ref 164–446)
PMV BLD AUTO: 9.4 FL (ref 9–12.9)
POTASSIUM SERPL-SCNC: 3.9 MMOL/L (ref 3.6–5.5)
PROT SERPL-MCNC: 7 G/DL (ref 6–8.2)
RBC # BLD AUTO: 3.83 M/UL (ref 4.2–5.4)
SODIUM SERPL-SCNC: 138 MMOL/L (ref 135–145)
WBC # BLD AUTO: 4.5 K/UL (ref 4.8–10.8)

## 2019-01-08 PROCEDURE — 700111 HCHG RX REV CODE 636 W/ 250 OVERRIDE (IP): Performed by: NURSE PRACTITIONER

## 2019-01-08 PROCEDURE — 71045 X-RAY EXAM CHEST 1 VIEW: CPT

## 2019-01-08 PROCEDURE — 700101 HCHG RX REV CODE 250: Performed by: NURSE PRACTITIONER

## 2019-01-08 PROCEDURE — 85025 COMPLETE CBC W/AUTO DIFF WBC: CPT

## 2019-01-08 PROCEDURE — A9270 NON-COVERED ITEM OR SERVICE: HCPCS | Performed by: NURSE PRACTITIONER

## 2019-01-08 PROCEDURE — 700112 HCHG RX REV CODE 229: Performed by: NURSE PRACTITIONER

## 2019-01-08 PROCEDURE — 80053 COMPREHEN METABOLIC PANEL: CPT

## 2019-01-08 PROCEDURE — A9270 NON-COVERED ITEM OR SERVICE: HCPCS | Performed by: SURGERY

## 2019-01-08 PROCEDURE — 73000 X-RAY EXAM OF COLLAR BONE: CPT | Mod: RT

## 2019-01-08 PROCEDURE — 700111 HCHG RX REV CODE 636 W/ 250 OVERRIDE (IP): Performed by: SURGERY

## 2019-01-08 PROCEDURE — 770006 HCHG ROOM/CARE - MED/SURG/GYN SEMI*

## 2019-01-08 PROCEDURE — 36415 COLL VENOUS BLD VENIPUNCTURE: CPT

## 2019-01-08 PROCEDURE — 700102 HCHG RX REV CODE 250 W/ 637 OVERRIDE(OP): Performed by: NURSE PRACTITIONER

## 2019-01-08 PROCEDURE — 700102 HCHG RX REV CODE 250 W/ 637 OVERRIDE(OP): Performed by: SURGERY

## 2019-01-08 RX ADMIN — Medication 1 EACH: at 05:17

## 2019-01-08 RX ADMIN — Medication 1 EACH: at 17:06

## 2019-01-08 RX ADMIN — DOCUSATE SODIUM 100 MG: 100 CAPSULE, LIQUID FILLED ORAL at 17:05

## 2019-01-08 RX ADMIN — ACETAMINOPHEN 650 MG: 325 TABLET, FILM COATED ORAL at 12:23

## 2019-01-08 RX ADMIN — Medication 1 EACH: at 12:23

## 2019-01-08 RX ADMIN — ACETAMINOPHEN 650 MG: 325 TABLET, FILM COATED ORAL at 00:20

## 2019-01-08 RX ADMIN — LEVETIRACETAM 500 MG: 500 TABLET, FILM COATED ORAL at 17:05

## 2019-01-08 RX ADMIN — MAGNESIUM HYDROXIDE 30 ML: 400 SUSPENSION ORAL at 08:28

## 2019-01-08 RX ADMIN — LEVETIRACETAM 500 MG: 500 TABLET, FILM COATED ORAL at 05:14

## 2019-01-08 RX ADMIN — ACETAMINOPHEN 650 MG: 325 TABLET, FILM COATED ORAL at 05:14

## 2019-01-08 RX ADMIN — ONDANSETRON 4 MG: 2 INJECTION INTRAMUSCULAR; INTRAVENOUS at 09:29

## 2019-01-08 RX ADMIN — ENOXAPARIN SODIUM 30 MG: 100 INJECTION SUBCUTANEOUS at 17:06

## 2019-01-08 RX ADMIN — DOCUSATE SODIUM 100 MG: 100 CAPSULE, LIQUID FILLED ORAL at 05:14

## 2019-01-08 RX ADMIN — ACETAMINOPHEN 650 MG: 325 TABLET, FILM COATED ORAL at 17:05

## 2019-01-08 ASSESSMENT — ENCOUNTER SYMPTOMS
DOUBLE VISION: 0
NEUROLOGICAL NEGATIVE: 1
CONSTIPATION: 0
SHORTNESS OF BREATH: 0
MYALGIAS: 1
PSYCHIATRIC NEGATIVE: 1
ABDOMINAL PAIN: 0
NAUSEA: 0
VOMITING: 0

## 2019-01-08 ASSESSMENT — PAIN SCALES - GENERAL
PAINLEVEL_OUTOF10: 0
PAINLEVEL_OUTOF10: 3
PAINLEVEL_OUTOF10: 0
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
## 2019-01-08 NOTE — PROGRESS NOTES
Pt transferred from ICU to Neuro. Pt A&Ox4. Vitals stable. Pt having headache. Fioricet given.     2 RN skin check completed. Pt has laceration with staples open to air. Scattered abrasions and bruising throughout upper extremities. Bruising noted to R eye. Otherwise skin is intact.

## 2019-01-08 NOTE — PROGRESS NOTES
2 RN skin assessment complete. Areas to note:    - bruising and swelling to right eye  -posterior head lac closed with staples. Open to air with some dried blood  -scattered right upper extremity bruising and abrasions

## 2019-01-09 ENCOUNTER — APPOINTMENT (OUTPATIENT)
Dept: RADIOLOGY | Facility: MEDICAL CENTER | Age: 24
DRG: 087 | End: 2019-01-09
Attending: NURSE PRACTITIONER
Payer: COMMERCIAL

## 2019-01-09 ENCOUNTER — HOSPITAL ENCOUNTER (INPATIENT)
Dept: RADIOLOGY | Facility: MEDICAL CENTER | Age: 24
DRG: 087 | End: 2019-01-09
Attending: NURSE PRACTITIONER | Admitting: SURGERY
Payer: COMMERCIAL

## 2019-01-09 VITALS
HEART RATE: 78 BPM | DIASTOLIC BLOOD PRESSURE: 68 MMHG | OXYGEN SATURATION: 96 % | SYSTOLIC BLOOD PRESSURE: 108 MMHG | WEIGHT: 121.47 LBS | RESPIRATION RATE: 16 BRPM | BODY MASS INDEX: 22.35 KG/M2 | TEMPERATURE: 98.1 F | HEIGHT: 62 IN

## 2019-01-09 PROCEDURE — 700102 HCHG RX REV CODE 250 W/ 637 OVERRIDE(OP): Performed by: NURSE PRACTITIONER

## 2019-01-09 PROCEDURE — A9270 NON-COVERED ITEM OR SERVICE: HCPCS | Performed by: NURSE PRACTITIONER

## 2019-01-09 PROCEDURE — 700101 HCHG RX REV CODE 250: Performed by: NURSE PRACTITIONER

## 2019-01-09 PROCEDURE — 71045 X-RAY EXAM CHEST 1 VIEW: CPT

## 2019-01-09 PROCEDURE — A9270 NON-COVERED ITEM OR SERVICE: HCPCS | Performed by: SURGERY

## 2019-01-09 PROCEDURE — 700112 HCHG RX REV CODE 229: Performed by: NURSE PRACTITIONER

## 2019-01-09 PROCEDURE — 700102 HCHG RX REV CODE 250 W/ 637 OVERRIDE(OP): Performed by: SURGERY

## 2019-01-09 RX ORDER — ACETAMINOPHEN 325 MG/1
650 TABLET ORAL EVERY 4 HOURS PRN
Status: ON HOLD | COMMUNITY
Start: 2019-01-09 | End: 2019-01-30

## 2019-01-09 RX ORDER — LEVETIRACETAM 500 MG/1
500 TABLET ORAL 2 TIMES DAILY
Qty: 4 TAB | Refills: 0 | Status: SHIPPED | OUTPATIENT
Start: 2019-01-09 | End: 2019-01-11

## 2019-01-09 RX ADMIN — LEVETIRACETAM 500 MG: 500 TABLET, FILM COATED ORAL at 05:23

## 2019-01-09 RX ADMIN — ACETAMINOPHEN 650 MG: 325 TABLET, FILM COATED ORAL at 13:09

## 2019-01-09 RX ADMIN — Medication 1 EACH: at 05:23

## 2019-01-09 RX ADMIN — ACETAMINOPHEN 650 MG: 325 TABLET, FILM COATED ORAL at 05:23

## 2019-01-09 RX ADMIN — DOCUSATE SODIUM 100 MG: 100 CAPSULE, LIQUID FILLED ORAL at 05:23

## 2019-01-09 RX ADMIN — Medication 1 EACH: at 13:09

## 2019-01-09 ASSESSMENT — ENCOUNTER SYMPTOMS
VOMITING: 0
MYALGIAS: 1
NAUSEA: 0
NEUROLOGICAL NEGATIVE: 1
ROS GI COMMENTS: BM 1/7
CONSTIPATION: 0
DOUBLE VISION: 0
SHORTNESS OF BREATH: 0
ABDOMINAL PAIN: 0

## 2019-01-09 ASSESSMENT — PAIN SCALES - GENERAL: PAINLEVEL_OUTOF10: 0
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
## 2019-01-09 NOTE — DISCHARGE INSTRUCTIONS
Discharge Instructions    Discharged to home by car with relative. Discharged via wheelchair, hospital escort: Yes.  Special equipment needed: Not Applicable    Be sure to schedule a follow-up appointment with your primary care doctor or any specialists as instructed.     Discharge Plan:   Diet Plan: Discussed  Activity Level: Discussed  Confirmed Follow up Appointment: Appointment Scheduled  Confirmed Symptoms Management: Discussed  Medication Reconciliation Updated: Yes  Influenza Vaccine Indication: Not indicated: Previously immunized this influenza season and > 8 years of age    I understand that a diet low in cholesterol, fat, and sodium is recommended for good health. Unless I have been given specific instructions below for another diet, I accept this instruction as my diet prescription.   Other diet: Full Liquid and advance as tolerated.    Special Instructions: None    · Is patient discharged on Warfarin / Coumadin?   No     Depression / Suicide Risk    As you are discharged from this [facility] facility, it is important to learn how to keep safe from harming yourself.    Recognize the warning signs:  · Abrupt changes in personality, positive or negative- including increase in energy   · Giving away possessions  · Change in eating patterns- significant weight changes-  positive or negative  · Change in sleeping patterns- unable to sleep or sleeping all the time   · Unwillingness or inability to communicate  · Depression  · Unusual sadness, discouragement and loneliness  · Talk of wanting to die  · Neglect of personal appearance   · Rebelliousness- reckless behavior  · Withdrawal from people/activities they love  · Confusion- inability to concentrate     If you or a loved one observes any of these behaviors or has concerns about self-harm, here's what you can do:  · Talk about it- your feelings and reasons for harming yourself  · Remove any means that you might use to hurt yourself (examples: pills, rope,  extension cords, firearm)  · Get professional help from the community (Mental Health, Substance Abuse, psychological counseling)  · Do not be alone:Call your Safe Contact- someone whom you trust who will be there for you.  · Call your local CRISIS HOTLINE 032-4739 or 108-070-9670  · Call your local Children's Mobile Crisis Response Team [place] (343) 495-4329 or www.[employer]  · Call the toll free National Suicide Prevention Hotlines   · National Suicide Prevention Lifeline 101-579-EVQD (5828)  · National Hope Line Network 800-SUICIDE (700-0491)

## 2019-01-09 NOTE — PROGRESS NOTES
Pt A&Ox4. Vitals stable. No complaints of nausea or pain during shift. Pt resting comfortably. Pt requires no assistance to bathroom. Hourly rounding completed.
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
## 2019-01-09 NOTE — CARE PLAN
Problem: Bowel/Gastric:  Goal: Normal bowel function is maintained or improved  Outcome: PROGRESSING SLOWER THAN EXPECTED  Tentative d/c tomorrow. Will try full liquids for breakfast this morning. Still feeling nauseated when eating during prior day shift.     Problem: Discharge Barriers/Planning  Goal: Patient's continuum of care needs will be met  Outcome: PROGRESSING AS EXPECTED      Problem: Pain Management  Goal: Pain level will decrease to patient's comfort goal  Outcome: PROGRESSING AS EXPECTED  No headache during shift. Tylenol given as scheduled while awake.

## 2019-01-10 NOTE — PROGRESS NOTES
"/68   Pulse 78   Temp 36.7 °C (98.1 °F) (Temporal)   Resp 16   Ht 1.575 m (5' 2\")   Wt 55.1 kg (121 lb 7.6 oz)   SpO2 96%   Breastfeeding? No   BMI 22.22 kg/m²     Reviewed images with orthopedic surgery  Non-op management  NWB RUE, Sling for comfort, ok for ROM  Follow up in 1-2 weeks at clinic  Cleared for discharge  Patient counseled  "

## 2019-01-30 ENCOUNTER — APPOINTMENT (OUTPATIENT)
Dept: RADIOLOGY | Facility: MEDICAL CENTER | Age: 24
End: 2019-01-30
Attending: ORTHOPAEDIC SURGERY
Payer: COMMERCIAL

## 2019-01-30 ENCOUNTER — HOSPITAL ENCOUNTER (OUTPATIENT)
Facility: MEDICAL CENTER | Age: 24
End: 2019-01-30
Attending: ORTHOPAEDIC SURGERY | Admitting: ORTHOPAEDIC SURGERY
Payer: COMMERCIAL

## 2019-01-30 VITALS
HEIGHT: 62 IN | HEART RATE: 93 BPM | DIASTOLIC BLOOD PRESSURE: 66 MMHG | BODY MASS INDEX: 23.12 KG/M2 | OXYGEN SATURATION: 100 % | TEMPERATURE: 97.4 F | RESPIRATION RATE: 20 BRPM | WEIGHT: 125.66 LBS | SYSTOLIC BLOOD PRESSURE: 120 MMHG

## 2019-01-30 DIAGNOSIS — S42.031D CLOSED DISPLACED FRACTURE OF ACROMIAL END OF RIGHT CLAVICLE WITH ROUTINE HEALING: ICD-10-CM

## 2019-01-30 LAB
HCG UR QL: NEGATIVE
SP GR UR REFRACTOMETRY: 1.02

## 2019-01-30 PROCEDURE — A6402 STERILE GAUZE <= 16 SQ IN: HCPCS | Performed by: ORTHOPAEDIC SURGERY

## 2019-01-30 PROCEDURE — A4565 SLINGS: HCPCS | Performed by: ORTHOPAEDIC SURGERY

## 2019-01-30 PROCEDURE — 160022 HCHG BLOCK: Performed by: ORTHOPAEDIC SURGERY

## 2019-01-30 PROCEDURE — 160029 HCHG SURGERY MINUTES - 1ST 30 MINS LEVEL 4: Performed by: ORTHOPAEDIC SURGERY

## 2019-01-30 PROCEDURE — 160036 HCHG PACU - EA ADDL 30 MINS PHASE I: Performed by: ORTHOPAEDIC SURGERY

## 2019-01-30 PROCEDURE — 700111 HCHG RX REV CODE 636 W/ 250 OVERRIDE (IP)

## 2019-01-30 PROCEDURE — 700102 HCHG RX REV CODE 250 W/ 637 OVERRIDE(OP): Performed by: ANESTHESIOLOGY

## 2019-01-30 PROCEDURE — 160047 HCHG PACU  - EA ADDL 30 MINS PHASE II: Performed by: ORTHOPAEDIC SURGERY

## 2019-01-30 PROCEDURE — 160002 HCHG RECOVERY MINUTES (STAT): Performed by: ORTHOPAEDIC SURGERY

## 2019-01-30 PROCEDURE — 160035 HCHG PACU - 1ST 60 MINS PHASE I: Performed by: ORTHOPAEDIC SURGERY

## 2019-01-30 PROCEDURE — 160025 RECOVERY II MINUTES (STATS): Performed by: ORTHOPAEDIC SURGERY

## 2019-01-30 PROCEDURE — 73000 X-RAY EXAM OF COLLAR BONE: CPT | Mod: RT

## 2019-01-30 PROCEDURE — 502000 HCHG MISC OR IMPLANTS RC 0278: Performed by: ORTHOPAEDIC SURGERY

## 2019-01-30 PROCEDURE — 160009 HCHG ANES TIME/MIN: Performed by: ORTHOPAEDIC SURGERY

## 2019-01-30 PROCEDURE — 160041 HCHG SURGERY MINUTES - EA ADDL 1 MIN LEVEL 4: Performed by: ORTHOPAEDIC SURGERY

## 2019-01-30 PROCEDURE — 160048 HCHG OR STATISTICAL LEVEL 1-5: Performed by: ORTHOPAEDIC SURGERY

## 2019-01-30 PROCEDURE — A9270 NON-COVERED ITEM OR SERVICE: HCPCS | Performed by: ANESTHESIOLOGY

## 2019-01-30 PROCEDURE — 160046 HCHG PACU - 1ST 60 MINS PHASE II: Performed by: ORTHOPAEDIC SURGERY

## 2019-01-30 PROCEDURE — 700101 HCHG RX REV CODE 250

## 2019-01-30 PROCEDURE — 501838 HCHG SUTURE GENERAL: Performed by: ORTHOPAEDIC SURGERY

## 2019-01-30 PROCEDURE — 500891 HCHG PACK, ORTHO MAJOR: Performed by: ORTHOPAEDIC SURGERY

## 2019-01-30 PROCEDURE — 81025 URINE PREGNANCY TEST: CPT

## 2019-01-30 PROCEDURE — C1713 ANCHOR/SCREW BN/BN,TIS/BN: HCPCS | Performed by: ORTHOPAEDIC SURGERY

## 2019-01-30 DEVICE — IMPLANTABLE DEVICE: Type: IMPLANTABLE DEVICE | Site: CLAVICLE | Status: FUNCTIONAL

## 2019-01-30 DEVICE — WIRE K 1.6 X 150 292.16 (10EA/PK) (11TX10+2TX6+1TX20=142)(CYC=30): Type: IMPLANTABLE DEVICE | Site: CLAVICLE | Status: FUNCTIONAL

## 2019-01-30 DEVICE — SCREW CORT 3.5X16MM ST HEX (4TX6+1TX4+1TX3=31)(SDS=22): Type: IMPLANTABLE DEVICE | Site: CLAVICLE | Status: FUNCTIONAL

## 2019-01-30 DEVICE — SCREW CORT 3.5X14MM ST HEX (4TX6+1TX4+1TX3=31)(SDS=22): Type: IMPLANTABLE DEVICE | Site: CLAVICLE | Status: FUNCTIONAL

## 2019-01-30 DEVICE — SCREW CORT 3.5X12MM ST HEX (4TX6+1TX4+1TX3=31)(SDS=22): Type: IMPLANTABLE DEVICE | Site: CLAVICLE | Status: FUNCTIONAL

## 2019-01-30 RX ORDER — LIDOCAINE HYDROCHLORIDE 10 MG/ML
INJECTION, SOLUTION INFILTRATION; PERINEURAL
Status: COMPLETED
Start: 2019-01-30 | End: 2019-01-30

## 2019-01-30 RX ORDER — DIPHENHYDRAMINE HYDROCHLORIDE 50 MG/ML
12.5 INJECTION INTRAMUSCULAR; INTRAVENOUS
Status: DISCONTINUED | OUTPATIENT
Start: 2019-01-30 | End: 2019-01-30 | Stop reason: HOSPADM

## 2019-01-30 RX ORDER — HYDROMORPHONE HYDROCHLORIDE 1 MG/ML
0.4 INJECTION, SOLUTION INTRAMUSCULAR; INTRAVENOUS; SUBCUTANEOUS
Status: DISCONTINUED | OUTPATIENT
Start: 2019-01-30 | End: 2019-01-30 | Stop reason: HOSPADM

## 2019-01-30 RX ORDER — SODIUM CHLORIDE, SODIUM LACTATE, POTASSIUM CHLORIDE, AND CALCIUM CHLORIDE .6; .31; .03; .02 G/100ML; G/100ML; G/100ML; G/100ML
1000 INJECTION, SOLUTION INTRAVENOUS ONCE
Status: COMPLETED | OUTPATIENT
Start: 2019-01-30 | End: 2019-01-30

## 2019-01-30 RX ORDER — OXYCODONE HYDROCHLORIDE 5 MG/1
5 TABLET ORAL EVERY 4 HOURS PRN
Qty: 40 TAB | Refills: 0 | Status: SHIPPED | OUTPATIENT
Start: 2019-01-30 | End: 2019-02-06

## 2019-01-30 RX ORDER — HYDROMORPHONE HYDROCHLORIDE 1 MG/ML
0.2 INJECTION, SOLUTION INTRAMUSCULAR; INTRAVENOUS; SUBCUTANEOUS
Status: DISCONTINUED | OUTPATIENT
Start: 2019-01-30 | End: 2019-01-30 | Stop reason: HOSPADM

## 2019-01-30 RX ORDER — HALOPERIDOL 5 MG/ML
1 INJECTION INTRAMUSCULAR
Status: DISCONTINUED | OUTPATIENT
Start: 2019-01-30 | End: 2019-01-30 | Stop reason: HOSPADM

## 2019-01-30 RX ORDER — OXYCODONE HCL 5 MG/5 ML
5 SOLUTION, ORAL ORAL
Status: DISCONTINUED | OUTPATIENT
Start: 2019-01-30 | End: 2019-01-30 | Stop reason: HOSPADM

## 2019-01-30 RX ORDER — SODIUM CHLORIDE, SODIUM LACTATE, POTASSIUM CHLORIDE, CALCIUM CHLORIDE 600; 310; 30; 20 MG/100ML; MG/100ML; MG/100ML; MG/100ML
INJECTION, SOLUTION INTRAVENOUS ONCE
Status: COMPLETED | OUTPATIENT
Start: 2019-01-30 | End: 2019-01-30

## 2019-01-30 RX ORDER — GABAPENTIN 300 MG/1
300 CAPSULE ORAL ONCE
Status: COMPLETED | OUTPATIENT
Start: 2019-01-30 | End: 2019-01-30

## 2019-01-30 RX ORDER — OXYCODONE HYDROCHLORIDE 5 MG/1
5 TABLET ORAL ONCE
Status: COMPLETED | OUTPATIENT
Start: 2019-01-30 | End: 2019-01-30

## 2019-01-30 RX ORDER — HYDRALAZINE HYDROCHLORIDE 20 MG/ML
5 INJECTION INTRAMUSCULAR; INTRAVENOUS
Status: DISCONTINUED | OUTPATIENT
Start: 2019-01-30 | End: 2019-01-30 | Stop reason: HOSPADM

## 2019-01-30 RX ORDER — CELECOXIB 200 MG/1
200 CAPSULE ORAL ONCE
Status: COMPLETED | OUTPATIENT
Start: 2019-01-30 | End: 2019-01-30

## 2019-01-30 RX ORDER — SODIUM CHLORIDE, SODIUM LACTATE, POTASSIUM CHLORIDE, CALCIUM CHLORIDE 600; 310; 30; 20 MG/100ML; MG/100ML; MG/100ML; MG/100ML
INJECTION, SOLUTION INTRAVENOUS CONTINUOUS
Status: DISCONTINUED | OUTPATIENT
Start: 2019-01-30 | End: 2019-01-30 | Stop reason: HOSPADM

## 2019-01-30 RX ORDER — MEPERIDINE HYDROCHLORIDE 25 MG/ML
12.5 INJECTION INTRAMUSCULAR; INTRAVENOUS; SUBCUTANEOUS
Status: DISCONTINUED | OUTPATIENT
Start: 2019-01-30 | End: 2019-01-30 | Stop reason: HOSPADM

## 2019-01-30 RX ORDER — ONDANSETRON 2 MG/ML
4 INJECTION INTRAMUSCULAR; INTRAVENOUS
Status: DISCONTINUED | OUTPATIENT
Start: 2019-01-30 | End: 2019-01-30 | Stop reason: HOSPADM

## 2019-01-30 RX ORDER — MAGNESIUM HYDROXIDE 1200 MG/15ML
LIQUID ORAL
Status: COMPLETED | OUTPATIENT
Start: 2019-01-30 | End: 2019-01-30

## 2019-01-30 RX ORDER — SCOLOPAMINE TRANSDERMAL SYSTEM 1 MG/1
1 PATCH, EXTENDED RELEASE TRANSDERMAL
Status: DISCONTINUED | OUTPATIENT
Start: 2019-01-30 | End: 2019-01-30 | Stop reason: HOSPADM

## 2019-01-30 RX ORDER — ACETAMINOPHEN 500 MG
1000 TABLET ORAL ONCE
Status: COMPLETED | OUTPATIENT
Start: 2019-01-30 | End: 2019-01-30

## 2019-01-30 RX ORDER — OXYCODONE HCL 5 MG/5 ML
10 SOLUTION, ORAL ORAL
Status: DISCONTINUED | OUTPATIENT
Start: 2019-01-30 | End: 2019-01-30 | Stop reason: HOSPADM

## 2019-01-30 RX ORDER — ACETAMINOPHEN 500 MG
500 TABLET ORAL EVERY 6 HOURS PRN
COMMUNITY
End: 2019-07-09

## 2019-01-30 RX ORDER — HYDROMORPHONE HYDROCHLORIDE 1 MG/ML
0.5 INJECTION, SOLUTION INTRAMUSCULAR; INTRAVENOUS; SUBCUTANEOUS
Status: DISCONTINUED | OUTPATIENT
Start: 2019-01-30 | End: 2019-01-30 | Stop reason: HOSPADM

## 2019-01-30 RX ADMIN — SODIUM CHLORIDE, SODIUM LACTATE, POTASSIUM CHLORIDE, CALCIUM CHLORIDE: 600; 310; 30; 20 INJECTION, SOLUTION INTRAVENOUS at 12:45

## 2019-01-30 RX ADMIN — ACETAMINOPHEN 1000 MG: 500 TABLET, FILM COATED ORAL at 10:26

## 2019-01-30 RX ADMIN — CELECOXIB 200 MG: 200 CAPSULE ORAL at 10:27

## 2019-01-30 RX ADMIN — Medication 0.5 ML: at 10:27

## 2019-01-30 RX ADMIN — SCOPOLAMINE 1 PATCH: 1 PATCH, EXTENDED RELEASE TRANSDERMAL at 10:49

## 2019-01-30 RX ADMIN — GABAPENTIN 300 MG: 300 CAPSULE ORAL at 10:27

## 2019-01-30 RX ADMIN — SODIUM CHLORIDE, SODIUM LACTATE, POTASSIUM CHLORIDE, CALCIUM CHLORIDE: 600; 310; 30; 20 INJECTION, SOLUTION INTRAVENOUS at 10:27

## 2019-01-30 RX ADMIN — OXYCODONE HYDROCHLORIDE 5 MG: 5 TABLET ORAL at 10:26

## 2019-01-30 RX ADMIN — LIDOCAINE HYDROCHLORIDE 0.5 ML: 10 INJECTION, SOLUTION INFILTRATION; PERINEURAL at 10:27

## 2019-01-30 RX ADMIN — SODIUM CHLORIDE, SODIUM LACTATE, POTASSIUM CHLORIDE, AND CALCIUM CHLORIDE 1000 ML: .6; .31; .03; .02 INJECTION, SOLUTION INTRAVENOUS at 13:30

## 2019-01-30 NOTE — OR NURSING
Patient continues to rest in bed, significant other at bedside.  Report handed off to RICHA Valentino.

## 2019-01-30 NOTE — OR NURSING
Dr Soler updated with patient status.  Still -110 after fluid bolus.  VSS. Patient calm and denies pain. Still left pupil 4mm and right 2mm. Which is consistant with block side effect per Dr Martínez should wear off when block wers off. No difficulty swollowing or speech.  Plan to discharge home ok by MD. Family updated with patient plan. Report called to Catalino hardin

## 2019-01-30 NOTE — OR NURSING
Patient attempted to get up and get dressed and began feeling light headed, nauseas and faint. IV fluids resumed, patient laid back down in bed. Positioned for comfort. Ice pack in place to surgical site. Mom and significant at bedside.

## 2019-01-30 NOTE — PROGRESS NOTES
Med rec updated and complete  Allergies reviewed  Interviewed pt with boyfriend at bedside with permission from pt.  Pt reports no prescription medications or vitamins.  Pt reports no antibiotics in the last 30 days.

## 2019-01-30 NOTE — OR NURSING
"Patient post-op right clavicle orif.  -130.  Dr Soler at bedside states \"pt came in with tachy rate 100's.  Ok to give LR bolus 1 liter now.  Update him as needed.  Patient other VS WNL.  Stated patient received block right shoulder with positive aston's sign with right eyelid droop. Cont to monitor.   "
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
## 2019-01-30 NOTE — DISCHARGE INSTRUCTIONS
ACTIVITY: Rest and take it easy for the first 24 hours.  A responsible adult is recommended to remain with you during that time.  It is normal to feel sleepy.  We encourage you to not do anything that requires balance, judgment or coordination.    MILD FLU-LIKE SYMPTOMS ARE NORMAL. YOU MAY EXPERIENCE GENERALIZED MUSCLE ACHES, THROAT IRRITATION, HEADACHE AND/OR SOME NAUSEA.    FOR 24 HOURS DO NOT:  Drive, operate machinery or run household appliances.  Drink beer or alcoholic beverages.   Make important decisions or sign legal documents.    SPECIAL INSTRUCTIONS:     Patient may wean sling and use upper extremity for activities of daily living.    Operative dressing can be removed in 48 hrs after which the patient may shower but not immerse the wound.    Daily dry gauze dressing changes following initial dressing change.    DIET: To avoid nausea, slowly advance diet as tolerated, avoiding spicy or greasy foods for the first day.  Add more substantial food to your diet according to your physician's instructions.  Babies can be fed formula or breast milk as soon as they are hungry.  INCREASE FLUIDS AND FIBER TO AVOID CONSTIPATION.    SURGICAL DRESSING/BATHING:   Remove dressing in 48hrs, then daily dress    FOLLOW-UP APPOINTMENT:  A follow-up appointment should be arranged with your doctor in 1-2 weeks; call to schedule.    You should CALL YOUR PHYSICIAN if you develop:  Fever greater than 101 degrees F.  Pain not relieved by medication, or persistent nausea or vomiting.  Excessive bleeding (blood soaking through dressing) or unexpected drainage from the wound.  Extreme redness or swelling around the incision site, drainage of pus or foul smelling drainage.  Inability to urinate or empty your bladder within 8 hours.  Problems with breathing or chest pain.    You should call 911 if you develop problems with breathing or chest pain.  If you are unable to contact your doctor or surgical center, you should go to the  nearest emergency room or urgent care center.  Physician's telephone #: Dr. Pfeiffer [phone]    If any questions arise, call your doctor.  If your doctor is not available, please feel free to call the Surgical Center at [phone].  The Center is open Monday through Friday from 7AM to 7PM.  You can also call the HEALTH HOTLINE open 24 hours/day, 7 days/week and speak to a nurse at [phone], or toll free at [phone].    A registered nurse may call you a few days after your surgery to see how you are doing after your procedure.    MEDICATIONS: Resume taking daily medication.  Take prescribed pain medication with food.  If no medication is prescribed, you may take non-aspirin pain medication if needed.  PAIN MEDICATION CAN BE VERY CONSTIPATING.  Take a stool softener or laxative such as senokot, pericolace, or milk of magnesia if needed.    Prescription given for Oxycodone Immediate-Release (for pain).  Last pain medication given at 10:26 am.    If your physician has prescribed pain medication that includes Acetaminophen (Tylenol), do not take additional Acetaminophen (Tylenol) while taking the prescribed medication.    Depression / Suicide Risk    As you are discharged from this [facility] facility, it is important to learn how to keep safe from harming yourself.    Recognize the warning signs:  · Abrupt changes in personality, positive or negative- including increase in energy   · Giving away possessions  · Change in eating patterns- significant weight changes-  positive or negative  · Change in sleeping patterns- unable to sleep or sleeping all the time   · Unwillingness or inability to communicate  · Depression  · Unusual sadness, discouragement and loneliness  · Talk of wanting to die  · Neglect of personal appearance   · Rebelliousness- reckless behavior  · Withdrawal from people/activities they love  · Confusion- inability to concentrate     If you or a loved one observes any of these  behaviors or has concerns about self-harm, here's what you can do:  · Talk about it- your feelings and reasons for harming yourself  · Remove any means that you might use to hurt yourself (examples: pills, rope, extension cords, firearm)  · Get professional help from the community (Mental Health, Substance Abuse, psychological counseling)  · Do not be alone:Call your Safe Contact- someone whom you trust who will be there for you.  · Call your local CRISIS HOTLINE 570-5404 or 026-751-4085  · Call your local Children's Mobile Crisis Response Team [place] (751) 642-6446 or www.Blaast  · Call the toll free National Suicide Prevention Hotlines   · National Suicide Prevention Lifeline 992-950-TTKL (6722)  · National Hope Line Network 800-SUICIDE (383-9619)

## 2019-01-31 NOTE — OR NURSING
Assume care for pt at 1600. Pt aa/ox4, VSS. Pt ambulate to the bathroom with SO and voided at 1630. Discharge criteria met. Pain is 2/10 which patient states is tolerable. Rt shoulder drsg with scant shadowing noted. Pt changed into clothing with assistance. Discharge instructions given; pt and family verbalized understanding and questions answered.  IV removed, tip intact. Will follow-up with Dr. lockhart. Prescriptions with pt. Pt discharged home in w/c with CNA in stable condition.

## 2019-02-11 ENCOUNTER — HOSPITAL ENCOUNTER (OUTPATIENT)
Dept: RADIOLOGY | Facility: MEDICAL CENTER | Age: 24
End: 2019-02-11
Attending: NEUROLOGICAL SURGERY
Payer: COMMERCIAL

## 2019-02-11 DIAGNOSIS — S09.90XA INJURY OF HEAD, INITIAL ENCOUNTER: ICD-10-CM

## 2019-02-11 DIAGNOSIS — R51.9 FACIAL PAIN: ICD-10-CM

## 2019-02-11 PROCEDURE — 70450 CT HEAD/BRAIN W/O DYE: CPT

## 2019-07-01 ENCOUNTER — PREP FOR PROCEDURE (OUTPATIENT)
Dept: SCHEDULING | Facility: MEDICAL CENTER | Age: 24
End: 2019-07-01

## 2019-07-01 PROBLEM — T84.498A OTHER MECHANICAL COMPLICATION OF OTHER INTERNAL ORTHOPEDIC DEVICES, IMPLANTS AND GRAFTS, INITIAL ENCOUNTER (HCC): Status: ACTIVE | Noted: 2019-07-01

## 2019-07-01 PROBLEM — S42.031D DISPLACED FRACTURE OF LATERAL END OF RIGHT CLAVICLE WITH ROUTINE HEALING: Status: ACTIVE | Noted: 2019-07-01

## 2019-07-01 RX ORDER — CEFAZOLIN SODIUM 2 G/100ML
2 INJECTION, SOLUTION INTRAVENOUS ONCE
Status: CANCELLED | OUTPATIENT
Start: 2019-07-11 | End: 2019-07-11

## 2019-07-09 DIAGNOSIS — Z01.812 PRE-OPERATIVE LABORATORY EXAMINATION: ICD-10-CM

## 2019-07-09 LAB — HCG SERPL QL: NEGATIVE

## 2019-07-09 PROCEDURE — 84703 CHORIONIC GONADOTROPIN ASSAY: CPT

## 2019-07-09 PROCEDURE — 36415 COLL VENOUS BLD VENIPUNCTURE: CPT

## 2019-07-11 ENCOUNTER — APPOINTMENT (OUTPATIENT)
Dept: RADIOLOGY | Facility: MEDICAL CENTER | Age: 24
End: 2019-07-11
Attending: ORTHOPAEDIC SURGERY
Payer: COMMERCIAL

## 2019-07-11 ENCOUNTER — HOSPITAL ENCOUNTER (OUTPATIENT)
Facility: MEDICAL CENTER | Age: 24
End: 2019-07-11
Attending: ORTHOPAEDIC SURGERY | Admitting: ORTHOPAEDIC SURGERY
Payer: COMMERCIAL

## 2019-07-11 ENCOUNTER — ANESTHESIA (OUTPATIENT)
Dept: SURGERY | Facility: MEDICAL CENTER | Age: 24
End: 2019-07-11
Payer: COMMERCIAL

## 2019-07-11 ENCOUNTER — ANESTHESIA EVENT (OUTPATIENT)
Dept: SURGERY | Facility: MEDICAL CENTER | Age: 24
End: 2019-07-11
Payer: COMMERCIAL

## 2019-07-11 VITALS
OXYGEN SATURATION: 98 % | SYSTOLIC BLOOD PRESSURE: 106 MMHG | HEART RATE: 78 BPM | BODY MASS INDEX: 23.85 KG/M2 | TEMPERATURE: 97.1 F | DIASTOLIC BLOOD PRESSURE: 50 MMHG | WEIGHT: 129.63 LBS | HEIGHT: 62 IN | RESPIRATION RATE: 14 BRPM

## 2019-07-11 DIAGNOSIS — T84.498A OTHER MECHANICAL COMPLICATION OF OTHER INTERNAL ORTHOPEDIC DEVICES, IMPLANTS AND GRAFTS, INITIAL ENCOUNTER (HCC): ICD-10-CM

## 2019-07-11 DIAGNOSIS — S42.031D DISPLACED FRACTURE OF LATERAL END OF RIGHT CLAVICLE WITH ROUTINE HEALING: ICD-10-CM

## 2019-07-11 PROCEDURE — 500881 HCHG PACK, EXTREMITY: Performed by: ORTHOPAEDIC SURGERY

## 2019-07-11 PROCEDURE — 501838 HCHG SUTURE GENERAL: Performed by: ORTHOPAEDIC SURGERY

## 2019-07-11 PROCEDURE — 700105 HCHG RX REV CODE 258: Performed by: ORTHOPAEDIC SURGERY

## 2019-07-11 PROCEDURE — 160028 HCHG SURGERY MINUTES - 1ST 30 MINS LEVEL 3: Performed by: ORTHOPAEDIC SURGERY

## 2019-07-11 PROCEDURE — 700102 HCHG RX REV CODE 250 W/ 637 OVERRIDE(OP): Performed by: ANESTHESIOLOGY

## 2019-07-11 PROCEDURE — 160036 HCHG PACU - EA ADDL 30 MINS PHASE I: Performed by: ORTHOPAEDIC SURGERY

## 2019-07-11 PROCEDURE — 700111 HCHG RX REV CODE 636 W/ 250 OVERRIDE (IP): Performed by: ANESTHESIOLOGY

## 2019-07-11 PROCEDURE — 700101 HCHG RX REV CODE 250: Performed by: ORTHOPAEDIC SURGERY

## 2019-07-11 PROCEDURE — 700101 HCHG RX REV CODE 250: Performed by: ANESTHESIOLOGY

## 2019-07-11 PROCEDURE — 160048 HCHG OR STATISTICAL LEVEL 1-5: Performed by: ORTHOPAEDIC SURGERY

## 2019-07-11 PROCEDURE — 160009 HCHG ANES TIME/MIN: Performed by: ORTHOPAEDIC SURGERY

## 2019-07-11 PROCEDURE — 160046 HCHG PACU - 1ST 60 MINS PHASE II: Performed by: ORTHOPAEDIC SURGERY

## 2019-07-11 PROCEDURE — 160047 HCHG PACU  - EA ADDL 30 MINS PHASE II: Performed by: ORTHOPAEDIC SURGERY

## 2019-07-11 PROCEDURE — A9270 NON-COVERED ITEM OR SERVICE: HCPCS | Performed by: ANESTHESIOLOGY

## 2019-07-11 PROCEDURE — 160002 HCHG RECOVERY MINUTES (STAT): Performed by: ORTHOPAEDIC SURGERY

## 2019-07-11 PROCEDURE — 160035 HCHG PACU - 1ST 60 MINS PHASE I: Performed by: ORTHOPAEDIC SURGERY

## 2019-07-11 PROCEDURE — 160039 HCHG SURGERY MINUTES - EA ADDL 1 MIN LEVEL 3: Performed by: ORTHOPAEDIC SURGERY

## 2019-07-11 PROCEDURE — 160025 RECOVERY II MINUTES (STATS): Performed by: ORTHOPAEDIC SURGERY

## 2019-07-11 PROCEDURE — 501445 HCHG STAPLER, SKIN DISP: Performed by: ORTHOPAEDIC SURGERY

## 2019-07-11 RX ORDER — OXYCODONE HCL 5 MG/5 ML
10 SOLUTION, ORAL ORAL
Status: COMPLETED | OUTPATIENT
Start: 2019-07-11 | End: 2019-07-11

## 2019-07-11 RX ORDER — SCOLOPAMINE TRANSDERMAL SYSTEM 1 MG/1
PATCH, EXTENDED RELEASE TRANSDERMAL PRN
Status: DISCONTINUED | OUTPATIENT
Start: 2019-07-11 | End: 2019-07-11 | Stop reason: SURG

## 2019-07-11 RX ORDER — SCOLOPAMINE TRANSDERMAL SYSTEM 1 MG/1
PATCH, EXTENDED RELEASE TRANSDERMAL
Status: COMPLETED
Start: 2019-07-11 | End: 2019-07-11

## 2019-07-11 RX ORDER — ACETAMINOPHEN 500 MG
1000 TABLET ORAL ONCE
Status: DISCONTINUED | OUTPATIENT
Start: 2019-07-11 | End: 2019-07-11 | Stop reason: HOSPADM

## 2019-07-11 RX ORDER — HALOPERIDOL 5 MG/ML
1 INJECTION INTRAMUSCULAR
Status: DISCONTINUED | OUTPATIENT
Start: 2019-07-11 | End: 2019-07-11 | Stop reason: HOSPADM

## 2019-07-11 RX ORDER — CELECOXIB 200 MG/1
200 CAPSULE ORAL ONCE
Status: DISCONTINUED | OUTPATIENT
Start: 2019-07-11 | End: 2019-07-11 | Stop reason: HOSPADM

## 2019-07-11 RX ORDER — SODIUM CHLORIDE, SODIUM LACTATE, POTASSIUM CHLORIDE, CALCIUM CHLORIDE 600; 310; 30; 20 MG/100ML; MG/100ML; MG/100ML; MG/100ML
INJECTION, SOLUTION INTRAVENOUS CONTINUOUS
Status: DISCONTINUED | OUTPATIENT
Start: 2019-07-11 | End: 2019-07-11 | Stop reason: HOSPADM

## 2019-07-11 RX ORDER — ONDANSETRON 2 MG/ML
4 INJECTION INTRAMUSCULAR; INTRAVENOUS
Status: COMPLETED | OUTPATIENT
Start: 2019-07-11 | End: 2019-07-11

## 2019-07-11 RX ORDER — ONDANSETRON 2 MG/ML
INJECTION INTRAMUSCULAR; INTRAVENOUS PRN
Status: DISCONTINUED | OUTPATIENT
Start: 2019-07-11 | End: 2019-07-11 | Stop reason: SURG

## 2019-07-11 RX ORDER — CETIRIZINE HYDROCHLORIDE 10 MG/1
10 TABLET ORAL PRN
COMMUNITY

## 2019-07-11 RX ORDER — CEFAZOLIN SODIUM 1 G/3ML
INJECTION, POWDER, FOR SOLUTION INTRAMUSCULAR; INTRAVENOUS PRN
Status: DISCONTINUED | OUTPATIENT
Start: 2019-07-11 | End: 2019-07-11 | Stop reason: SURG

## 2019-07-11 RX ORDER — GABAPENTIN 300 MG/1
300 CAPSULE ORAL ONCE
Status: DISCONTINUED | OUTPATIENT
Start: 2019-07-11 | End: 2019-07-11 | Stop reason: HOSPADM

## 2019-07-11 RX ORDER — MIDAZOLAM HYDROCHLORIDE 1 MG/ML
1 INJECTION INTRAMUSCULAR; INTRAVENOUS
Status: DISCONTINUED | OUTPATIENT
Start: 2019-07-11 | End: 2019-07-11 | Stop reason: HOSPADM

## 2019-07-11 RX ORDER — CEFAZOLIN SODIUM 2 G/100ML
2 INJECTION, SOLUTION INTRAVENOUS ONCE
Status: DISCONTINUED | OUTPATIENT
Start: 2019-07-11 | End: 2019-07-11 | Stop reason: HOSPADM

## 2019-07-11 RX ORDER — BUPIVACAINE HYDROCHLORIDE AND EPINEPHRINE 5; 5 MG/ML; UG/ML
INJECTION, SOLUTION EPIDURAL; INTRACAUDAL; PERINEURAL
Status: DISCONTINUED | OUTPATIENT
Start: 2019-07-11 | End: 2019-07-11 | Stop reason: HOSPADM

## 2019-07-11 RX ORDER — HYDROCODONE BITARTRATE AND ACETAMINOPHEN 5; 325 MG/1; MG/1
1 TABLET ORAL EVERY 4 HOURS PRN
Qty: 30 TAB | Refills: 0 | Status: SHIPPED | OUTPATIENT
Start: 2019-07-11 | End: 2019-07-16

## 2019-07-11 RX ORDER — DEXAMETHASONE SODIUM PHOSPHATE 4 MG/ML
INJECTION, SOLUTION INTRA-ARTICULAR; INTRALESIONAL; INTRAMUSCULAR; INTRAVENOUS; SOFT TISSUE PRN
Status: DISCONTINUED | OUTPATIENT
Start: 2019-07-11 | End: 2019-07-11 | Stop reason: SURG

## 2019-07-11 RX ORDER — OXYCODONE HCL 5 MG/5 ML
5 SOLUTION, ORAL ORAL
Status: COMPLETED | OUTPATIENT
Start: 2019-07-11 | End: 2019-07-11

## 2019-07-11 RX ORDER — DIPHENHYDRAMINE HYDROCHLORIDE 50 MG/ML
12.5 INJECTION INTRAMUSCULAR; INTRAVENOUS
Status: DISCONTINUED | OUTPATIENT
Start: 2019-07-11 | End: 2019-07-11 | Stop reason: HOSPADM

## 2019-07-11 RX ADMIN — FENTANYL CITRATE 25 MCG: 0.05 INJECTION, SOLUTION INTRAMUSCULAR; INTRAVENOUS at 08:36

## 2019-07-11 RX ADMIN — SCOPOLAMINE 1 PATCH: 1 PATCH, EXTENDED RELEASE TRANSDERMAL at 07:02

## 2019-07-11 RX ADMIN — ONDANSETRON 4 MG: 2 INJECTION INTRAMUSCULAR; INTRAVENOUS at 07:12

## 2019-07-11 RX ADMIN — MIDAZOLAM HYDROCHLORIDE 2 MG: 1 INJECTION, SOLUTION INTRAMUSCULAR; INTRAVENOUS at 07:12

## 2019-07-11 RX ADMIN — SODIUM CHLORIDE, POTASSIUM CHLORIDE, SODIUM LACTATE AND CALCIUM CHLORIDE: 600; 310; 30; 20 INJECTION, SOLUTION INTRAVENOUS at 07:02

## 2019-07-11 RX ADMIN — CEFAZOLIN 2 G: 330 INJECTION, POWDER, FOR SOLUTION INTRAMUSCULAR; INTRAVENOUS at 07:12

## 2019-07-11 RX ADMIN — PROPOFOL 160 MG: 10 INJECTION, EMULSION INTRAVENOUS at 07:12

## 2019-07-11 RX ADMIN — SODIUM CHLORIDE, POTASSIUM CHLORIDE, SODIUM LACTATE AND CALCIUM CHLORIDE: 600; 310; 30; 20 INJECTION, SOLUTION INTRAVENOUS at 05:41

## 2019-07-11 RX ADMIN — DEXAMETHASONE SODIUM PHOSPHATE 4 MG: 4 INJECTION, SOLUTION INTRA-ARTICULAR; INTRALESIONAL; INTRAMUSCULAR; INTRAVENOUS; SOFT TISSUE at 07:12

## 2019-07-11 RX ADMIN — FENTANYL CITRATE 25 MCG: 0.05 INJECTION, SOLUTION INTRAMUSCULAR; INTRAVENOUS at 08:40

## 2019-07-11 RX ADMIN — OXYCODONE HYDROCHLORIDE 10 MG: 5 SOLUTION ORAL at 08:35

## 2019-07-11 RX ADMIN — ONDANSETRON 4 MG: 2 INJECTION INTRAMUSCULAR; INTRAVENOUS at 09:32

## 2019-07-11 RX ADMIN — FENTANYL CITRATE 100 MCG: 50 INJECTION, SOLUTION INTRAMUSCULAR; INTRAVENOUS at 07:12

## 2019-07-11 RX ADMIN — LIDOCAINE HYDROCHLORIDE 0.5 ML: 10 INJECTION, SOLUTION EPIDURAL; INFILTRATION; INTRACAUDAL at 05:41

## 2019-07-11 ASSESSMENT — PAIN SCALES - GENERAL: PAIN_LEVEL: 2

## 2019-07-11 NOTE — ANESTHESIA PROCEDURE NOTES
Airway  Date/Time: 7/11/2019 7:13 AM  Performed by: PAUL MARIE  Authorized by: PAUL MARIE     Location:  OR  Urgency:  Elective  Indications for Airway Management:  Anesthesia  Spontaneous Ventilation: absent    Sedation Level:  Deep  Preoxygenated: Yes    Final Airway Type:  Supraglottic airway  Final Supraglottic Airway:  Standard LMA  SGA Size:  3  Number of Attempts at Approach:  1

## 2019-07-11 NOTE — ANESTHESIA POSTPROCEDURE EVALUATION
Patient: Lavinia Michelle    Procedure Summary     Date:  07/11/19 Room / Location:  [place] [address] / SURGERY [facility]    Anesthesia Start:  0702 Anesthesia Stop:  0800    Procedure:  HARDWARE REMOVAL ORTHO- CLAVICAL (Right Chest) Diagnosis:       Other mechanical complication of other internal orthopedic devices, implants and grafts, initial encounter ([facility])      Displaced fracture of lateral end of right clavicle with routine healing      (PAINFUL HARDWARE RIGHT CLAVICLE)    Surgeon:  Rubén Pfeiffer M.D. Responsible Provider:  James Sanches M.D.    Anesthesia Type:  general ASA Status:  1          Final Anesthesia Type: general  Last vitals  BP   Blood Pressure: 106/50, NIBP: 107/50    Temp   36.2 °C (97.2 °F)    Pulse   Pulse: 79, Heart Rate (Monitored): 80   Resp   14    SpO2   96 %      Anesthesia Post Evaluation    Patient location during evaluation: PACU  Patient participation: complete - patient participated  Level of consciousness: awake and alert  Pain score: 2    Airway patency: patent  Anesthetic complications: no  Cardiovascular status: hemodynamically stable  Respiratory status: acceptable  Hydration status: euvolemic    PONV: none           Nurse Pain Score: 2 (NPRS)

## 2019-07-11 NOTE — OR NURSING
0930 - pt stating she is very nauseous; medication given, see MAR.    0945 - pt states nausea is better but very sleepy. Pt resting in recliner. RRR, VSS. SO at bedside.    1015 - pt still sleeping. RRR, VSS.    103-50 - pt verbalizes readiness to get dressed. SO at bedside helping pt.    1050 - pt verbalizes readiness for discharge. Pt is more awake/alert. Discharge intructions reviewed with pt and pt's SO.     1055 - pt to bathroom to void.  Taken to car via wheelchair by CNA.

## 2019-07-11 NOTE — OP REPORT
DATE OF OPERATION: 7/11/2019     PREOPERATIVE DIAGNOSIS: right shoulder symptomatic hardware    POSTOPERATIVE DIAGNOSIS: Same    PROCEDURE PERFORMED: Removal of hardware right shoulder hook plate    SURGEON: Rubén Pfeiffer M.D.     ASSISTANT:  Gaetano Miguel DO    ANESTHESIOLOGIST: James Sanches MD.    ANESTHESIA: General    ASA CLASSIFICATION: I    INDICATIONS: The patient is a 24 y.o. female with symptomatic hardware of her right shoulder.  The patient has a normal neurovascular exam and skin envelope.  Radiographs demonstrated a healed fracture.  They deny any fevers, chills, or night sweats.  Given these findings, the patient is an appropriately indicated candidate for removal of hardware.  I discussed the risks and benefits of the procedure, including the risks of infection, wound healing complication, neurovascular injury, and the medical risks of anesthesia including MI, stroke, and death.  Benefits include pain relief.  Alternatives to surgery were also discussed, including non-operative management, which they did not elect.  The patient was in agreement with the plan to proceed, and the informed consent was signed and documented.  I met with the patient pre-operatively and marked the operative extremity with their agreement.  We proceeded to the operating room.     WOUND CLASSIFICATION: Class I    SPECIMEN: None    ESTIMATED BLOOD LOSS: 50 mL    PROCEDURE:  The patient underwent anesthesia, and was positioned in the supine position with all bony prominences well padded.  Sequential compression devices were employed.  The correct operative site was prepped using Chlora-Prep and draped into a sterile field, and the surgical team scrubbed in.  A procedural pause was conducted to verify correct patient, correct extremity, presence of the surgeons initials on the operative extremity, and administration of IV antibiotics, in this case, Ancef.    After generalized agreement, the prior surgical incision on the right  shoulder was excised sharply.  We dissected down to the hardware.  The screws were removed by hand power, the plate was removed, and the underlying bone was debrided with curettes and rongeurs. The wound was thoroughly irrigated with normal saline, then closed with 0 Vicryl sutures, 2-0 Vicryl sutures, and 3-0 Monocryl suture.  The wound was anesthetized with 0.5% Marcaine with epinephrine.  Sterile dressings were applied.  The patient tolerated the procedure well. There were no apparent complications. All sponge, needle, and instrument counts were correct on two separate occasions. She was awakened, extubated, and transferred to the recovery room in satisfactory condition.     The use of Dr. Gaetano Miguel DO as a surgical assistant was necessary for assistance with exposure, retraction, fracture reduction, instrumentation, and closure.      Post-Operative Plan:    1.  The patient may bear weight as tolerated  2.  IV antibiotics - none  3.  DVT prophylaxis - SCD's in recovery, otherwise none required  4.  Discharge home when criteria met.  Follow-up at the [facility] in 10 days - 2 weeks.  The wound dressing may be removed in 2 days, after which the patient may shower but not immerse the wound.  ____________________________________   Rubén Pfeiffer M.D.   DD: 7/11/2019  8:09 AM

## 2019-07-11 NOTE — ANESTHESIA QCDR
2019 [facility] Clinical Data Registry (for Quality Improvement)     Postoperative nausea/vomiting risk protocol (Adult = 18 yrs and Pediatric 3-17 yrs)- (430 and 463)  General inhalation anesthetic (NOT TIVA) with PONV risk factors: Yes  Provision of anti-emetic therapy with at least 2 different classes of agents: Yes   Patient DID NOT receive anti-emetic therapy and reason is documented in Medical Record:  N/A    Multimodal Pain Management- (AQI59)  Patient undergoing Elective Surgery (i.e. Outpatient, or ASC, or Prescheduled Surgery prior to Hospital Admission): Yes  Use of Multimodal Pain Management, two or more drugs and/or interventions, NOT including systemic opioids: Yes   Exception: Documented allergy to multiple classes of analgesics:  N/A    PACU assessment of acute postoperative pain prior to Anesthesia Care End- Applies to Patients Age = 18- (ABG7)  Initial PACU pain score is which of the following: < 7/10  Patient unable to report pain score: N/A    Post-anesthetic transfer of care checklist/protocol to PACU/ICU- (426 and 427)  Upon conclusion of case, patient transferred to which of the following locations: PACU/Non-ICU  Use of transfer checklist/protocol: Yes  Exclusion: Service Performed in Patient Hospital Room (and thus did not require transfer): N/A    PACU Reintubation- (AQI31)  General anesthesia requiring endotracheal intubation (ETT) along with subsequent extubation in OR or PACU: No  Required reintubation in the PACU: N/A  Extubation was a planned trial documented in the medical record prior to removal of the original airway device: N/A    Unplanned admission to ICU related to anesthesia service up through end of PACU care- (MD51)  Unplanned admission to ICU (not initially anticipated at anesthesia start time): No

## 2019-07-11 NOTE — DISCHARGE INSTRUCTIONS
ACTIVITY: Rest and take it easy for the first 24 hours.  A responsible adult is recommended to remain with you during that time.  It is normal to feel sleepy.  We encourage you to not do anything that requires balance, judgment or coordination.    MILD FLU-LIKE SYMPTOMS ARE NORMAL. YOU MAY EXPERIENCE GENERALIZED MUSCLE ACHES, THROAT IRRITATION, HEADACHE AND/OR SOME NAUSEA.    FOR 24 HOURS DO NOT:  Drive, operate machinery or run household appliances.  Drink beer or alcoholic beverages.   Make important decisions or sign legal documents.    SPECIAL INSTRUCTIONS:   Wound dressing may be removed in 2 days (7/13/)  Weight bearing as tolerated  Okay to shower with incisions covered; do not immerse wound in water until cleared by MD  Follow-up at the [facility] in 10 days - 2 weeks.     DIET: To avoid nausea, slowly advance diet as tolerated, avoiding spicy or greasy foods for the first day.  Add more substantial food to your diet according to your physician's instructions. INCREASE FLUIDS AND FIBER TO AVOID CONSTIPATION.    FOLLOW-UP APPOINTMENT:  A follow-up appointment should be arranged with your doctor in 1-2 weeks; call to schedule.    You should CALL YOUR PHYSICIAN if you develop:  Fever greater than 101 degrees F.  Pain not relieved by medication, or persistent nausea or vomiting.  Excessive bleeding (blood soaking through dressing) or unexpected drainage from the wound.  Extreme redness or swelling around the incision site, drainage of pus or foul smelling drainage.  Inability to urinate or empty your bladder within 8 hours.  Problems with breathing or chest pain.    You should call 911 if you develop problems with breathing or chest pain.  If you are unable to contact your doctor or surgical center, you should go to the nearest emergency room or urgent care center.  Physician's telephone #: Dr. Pfeiffer [phone]    If any questions arise, call your doctor.  If your doctor is not available,  please feel free to call the Surgical Center at [phone].  The Center is open Monday through Friday from 7AM to 7PM.  You can also call the HEALTH HOTLINE open 24 hours/day, 7 days/week and speak to a nurse at [phone], or toll free at [phone].    A registered nurse may call you a few days after your surgery to see how you are doing after your procedure.    MEDICATIONS: Resume taking daily medication.  Take prescribed pain medication with food.  If no medication is prescribed, you may take non-aspirin pain medication if needed.  PAIN MEDICATION CAN BE VERY CONSTIPATING.  Take a stool softener or laxative such as senokot, pericolace, or milk of magnesia if needed.    Prescription given for *Norco*.  Last pain medication given at *8:35am*.    If your physician has prescribed pain medication that includes Acetaminophen (Tylenol), do not take additional Acetaminophen (Tylenol) while taking the prescribed medication.    Depression / Suicide Risk    As you are discharged from this [facility] facility, it is important to learn how to keep safe from harming yourself.    Recognize the warning signs:  · Abrupt changes in personality, positive or negative- including increase in energy   · Giving away possessions  · Change in eating patterns- significant weight changes-  positive or negative  · Change in sleeping patterns- unable to sleep or sleeping all the time   · Unwillingness or inability to communicate  · Depression  · Unusual sadness, discouragement and loneliness  · Talk of wanting to die  · Neglect of personal appearance   · Rebelliousness- reckless behavior  · Withdrawal from people/activities they love  · Confusion- inability to concentrate     If you or a loved one observes any of these behaviors or has concerns about self-harm, here's what you can do:  · Talk about it- your feelings and reasons for harming yourself  · Remove any means that you might use to hurt yourself (examples: pills, rope,  extension cords, firearm)  · Get professional help from the community (Mental Health, Substance Abuse, psychological counseling)  · Do not be alone:Call your Safe Contact- someone whom you trust who will be there for you.  · Call your local CRISIS HOTLINE 859-6259 or 241-957-2376  · Call your local Children's Mobile Crisis Response Team [place] (675) 814-2951 or www.City [employer]  · Call the toll free National Suicide Prevention Hotlines   · National Suicide Prevention Lifeline 148-593-OJLT (6448)  · National Hope Line Network 800-SUICIDE (050-1642)

## 2019-07-11 NOTE — ANESTHESIA TIME REPORT
Anesthesia Start and Stop Event Times     Date Time Event    7/11/2019 0702 Anesthesia Start     0800 Anesthesia Stop        Responsible Staff  07/11/19    Name Role Begin End    James Sanches M.D. Anesth 0702 0800        Preop Diagnosis (Free Text):  Pre-op Diagnosis     PAINFUL HARDWARE RIGHT CLAVICLE        Preop Diagnosis (Codes):  Diagnosis Information     Diagnosis Code(s): Other mechanical complication of other internal orthopedic devices, implants and grafts, initial encounter ([facility]) [T84.498A]     Displaced fracture of lateral end of right clavicle with routine healing [S42.031D]        Post op Diagnosis  Other mechanical complication of other internal orthopedic device, implant, and graft      Premium Reason  Non-Premium    Comments:
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
## (undated) DEVICE — NEPTUNE 4 PORT MANIFOLD - (20/PK)

## (undated) DEVICE — STAPLER SKIN DISP - (6/BX 10BX/CA) VISISTAT

## (undated) DEVICE — LACTATED RINGERS INJ 1000 ML - (14EA/CA 60CA/PF)

## (undated) DEVICE — GLOVE SZ 7 BIOGEL PI MICRO - PF LF (50PR/BX 4BX/CA)

## (undated) DEVICE — SET EXTENSION WITH 2 PORTS (48EA/CA) ***PART #2C8610 IS A SUBSTITUTE*****

## (undated) DEVICE — DRAPE C-ARM LARGE 41IN X 74 IN - (10/BX 2BX/CA)

## (undated) DEVICE — SUTURE GENERAL

## (undated) DEVICE — CHLORAPREP 26 ML APPLICATOR - ORANGE TINT(25/CA)

## (undated) DEVICE — MASK ANESTHESIA ADULT  - (100/CA)

## (undated) DEVICE — KIT ANESTHESIA W/CIRCUIT & 3/LT BAG W/FILTER (20EA/CA)

## (undated) DEVICE — GLOVE BIOGEL SZ 8 SURGICAL PF LTX - (50PR/BX 4BX/CA)

## (undated) DEVICE — GLOVE BIOGEL INDICATOR SZ 8 SURGICAL PF LTX - (50/BX 4BX/CA)

## (undated) DEVICE — KIT ROOM DECONTAMINATION

## (undated) DEVICE — TUBING CLEARLINK DUO-VENT - C-FLO (48EA/CA)

## (undated) DEVICE — DRILL BIT 2.5 TWIST 310.25 (8TX2+1TX3=19)

## (undated) DEVICE — SENSOR SPO2 NEO LNCS ADHESIVE (20/BX) SEE USER NOTES

## (undated) DEVICE — SLEEVE, VASO, THIGH, MED

## (undated) DEVICE — SUCTION INSTRUMENT YANKAUER BULBOUS TIP W/O VENT (50EA/CA)

## (undated) DEVICE — DRAPE SURGICAL U 77X120 - (10/CA)

## (undated) DEVICE — GOWN WARMING STANDARD FLEX - (30/CA)

## (undated) DEVICE — GLOVE BIOGEL PI ORTHO SZ 7.5 PF LF (40PR/BX)

## (undated) DEVICE — PACK MAJOR ORTHO - (2EA/CA)

## (undated) DEVICE — GLOVE BIOGEL PI INDICATOR SZ 7.0 SURGICAL PF LF - (50/BX 4BX/CA)

## (undated) DEVICE — SLING ORTH UNV TIETX VLFM ARM

## (undated) DEVICE — DRAPE IOBAN II INCISE 23X17 - (10EA/BX 4BX/CA)

## (undated) DEVICE — GLOVE BIOGEL PI INDICATOR SZ 8.0 SURGICAL PF LF -(50/BX 4BX/CA)

## (undated) DEVICE — HEAD HOLDER JUNIOR/ADULT

## (undated) DEVICE — PACK LOWER EXTREMITY - (2/CA)

## (undated) DEVICE — NEEDLE NON SAFETY HYPO 22 GA X 1 1/2 IN (100/BX)

## (undated) DEVICE — SUTURE 2-0 VICRYL PLUS CT-1 - 8 X 18 INCH(12/BX)

## (undated) DEVICE — PROTECTOR ULNA NERVE - (36PR/CA)

## (undated) DEVICE — DRAPE STRLE REG TOWEL 18X24 - (10/BX 4BX/CA)"

## (undated) DEVICE — BLOCK

## (undated) DEVICE — MASK, LARYNGEAL AIRWAY #4

## (undated) DEVICE — ELECTRODE DUAL RETURN W/ CORD - (50/PK)

## (undated) DEVICE — CANISTER SUCTION 3000ML MECHANICAL FILTER AUTO SHUTOFF MEDI-VAC NONSTERILE LF DISP  (40EA/CA)

## (undated) DEVICE — SUTURE 0 VICRYL PLUS CT-1 - 8 X 18 INCH (12/BX)

## (undated) DEVICE — GLOVE BIOGEL SZ 7.5 SURGICAL PF LTX - (50PR/BX 4BX/CA)

## (undated) DEVICE — GLOVE BIOGEL SZ 6 PF LATEX - (50EA/BX 4BX/CA)

## (undated) DEVICE — DRAPE LARGE 3 QUARTER - (20/CA)

## (undated) DEVICE — GOWN SURGEONS X-LARGE - DISP. (30/CA)

## (undated) DEVICE — GLOVE BIOGEL ECLIPSE PF LATEX SIZE 8.0  (50PR/BX)

## (undated) DEVICE — DRESSING TRANSPARENT FILM TEGADERM 4 X 4.75" (50EA/BX)"

## (undated) DEVICE — SET LEADWIRE 5 LEAD BEDSIDE DISPOSABLE ECG (1SET OF 5/EA)

## (undated) DEVICE — PAD LAP STERILE 18 X 18 - (5/PK 40PK/CA)

## (undated) DEVICE — TOWELS CLOTH SURGICAL - (4/PK 20PK/CA)

## (undated) DEVICE — BOVIE BLADE COATED - (50/PK)

## (undated) DEVICE — DRAPE 36X28IN RAD CARM BND BG - (25/CA) O

## (undated) DEVICE — SUTURE 3-0 MONOCRYL PLUS PS-1 - 27 INCH (36/BX)

## (undated) DEVICE — GLOVE BIOGEL INDICATOR SZ 7.5 SURGICAL PF LTX - (50PR/BX 4BX/CA)

## (undated) DEVICE — SODIUM CHL IRRIGATION 0.9% 1000ML (12EA/CA)

## (undated) DEVICE — CLOSURE SKIN STRIP 1/2 X 4 IN - (STERI STRIP) (50/BX 4BX/CA)

## (undated) DEVICE — ELECTRODE 850 FOAM ADHESIVE - HYDROGEL RADIOTRNSPRNT (50/PK)

## (undated) DEVICE — SYRINGE 10 ML CONTROL LL (25EA/BX 4BX/CA)